# Patient Record
Sex: MALE | Race: WHITE | Employment: FULL TIME | URBAN - METROPOLITAN AREA
[De-identification: names, ages, dates, MRNs, and addresses within clinical notes are randomized per-mention and may not be internally consistent; named-entity substitution may affect disease eponyms.]

---

## 2017-01-13 ENCOUNTER — OFFICE VISIT (OUTPATIENT)
Dept: FAMILY MEDICINE | Facility: CLINIC | Age: 22
End: 2017-01-13
Payer: COMMERCIAL

## 2017-01-13 VITALS
OXYGEN SATURATION: 97 % | HEIGHT: 68 IN | TEMPERATURE: 98.4 F | BODY MASS INDEX: 34.54 KG/M2 | HEART RATE: 97 BPM | SYSTOLIC BLOOD PRESSURE: 138 MMHG | DIASTOLIC BLOOD PRESSURE: 82 MMHG | WEIGHT: 227.9 LBS

## 2017-01-13 DIAGNOSIS — A08.4 VIRAL GASTROENTERITIS: Primary | ICD-10-CM

## 2017-01-13 PROCEDURE — 99213 OFFICE O/P EST LOW 20 MIN: CPT | Performed by: FAMILY MEDICINE

## 2017-01-13 RX ORDER — ONDANSETRON 4 MG/1
4-8 TABLET, ORALLY DISINTEGRATING ORAL
Qty: 20 TABLET | Refills: 1 | Status: SHIPPED | OUTPATIENT
Start: 2017-01-13 | End: 2017-01-23

## 2017-01-13 NOTE — PROGRESS NOTES
"  SUBJECTIVE:                                                    Jesus Crow is a 21 year old male who presents to clinic today for the following health issues:    Acute Illness   Acute illness concerns: lower back pain  And stomach pain  Onset: yesterday jan 12     Fever: no    Chills/Sweats: YES    Headache (location?): YES    Sinus Pressure:no    Conjunctivitis:  no    Ear Pain: no    Rhinorrhea: no    Congestion: no    Sore Throat: no     Cough: no    Wheeze: no    Decreased Appetite: YES    Nausea: YES    Vomiting: YES    Diarrhea:  YES    Dysuria/Freq.: YES    Fatigue/Achiness: YES    Sick/Strep Exposure: no     Therapies Tried and outcome:       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     Reports vomiting overnight. Since then, feels nauseous but no vomiting.     Stool very loose, \"like water\", brown, no blood or mucous.     No new meds, supplements. No questionable foods.       Problem list and histories reviewed & adjusted, as indicated.  Additional history: none    Problem list, Medication list, Allergies, and Medical/Social/Surgical histories reviewed in [facility] and updated as appropriate.    ROS:  Constitutional, HEENT, cardiovascular, pulmonary, gi and gu systems are negative, except as otherwise noted.    OBJECTIVE:                                                    /82 mmHg  Pulse 97  Temp(Src) 98.4  F (36.9  C) (Oral)  Ht 5' 8\" (1.727 m)  Wt 227 lb 14.4 oz (103.375 kg)  BMI 34.66 kg/m2  SpO2 97%  Body mass index is 34.66 kg/(m^2).  GENERAL: healthy, alert and no distress  ABDOMEN: soft, nontender, no hepatosplenomegaly, no masses and overactive BS    Diagnostic Test Results:  none      ASSESSMENT/PLAN:                                                      1. Viral gastroenteritis - most likely diagnosis based on exposure and symptoms. Suggested anti-emetic, push fluids, bland foods as tolerated. Letter for work.   - ondansetron (ZOFRAN ODT) 4 MG ODT tab; Take 1-2 tablets (4-8 mg) by mouth 3 " times daily (before meals)  Dispense: 20 tablet; Refill: 1    Advised return should symptoms worsen.    Altagracia Younger MD  [facility]

## 2017-01-13 NOTE — PATIENT INSTRUCTIONS
Viral Gastroenteritis (Adult)    Gastroenteritis is commonly called the stomach flu. It is most often caused by a virus that affects the stomach and intestinal tract and usually lasts from 2 to 7 days. Common viruses causing gastroenteritis include norovirus, rotavirus, and hepatitis A. Non-viral causes of gastroenteritis include bacteria, parasites, and toxins.  The danger from repeated vomiting or diarrhea is dehydration. This is the loss of too much fluid from the body. When this occurs, body fluids must be replaced. Antibiotics do not help with this illness because it is usually viral.Simple home treatment will be helpful.  Symptoms of viral gastroenteritis may include:    Watery, loose stools    Stomach pain or abdominal cramps    Fever and chills    Nausea and vomiting    Loss of bowel control    Headache  Home care  Gastroenteritis is transmitted by contact with the stool or vomit of an infected person. This can occur from person to person or from contact with a contaminated surface.  Follow these guidelines when caring for yourself at home:    If symptoms are severe, rest at home for the next 24 hours or until you are feeling better.    Wash your hands with soap and water or use alcohol-based  to prevent the spread of infection. Wash your hands after touching anyone who is sick.    Wash your hands or use alcohol-based  after using the toilet and before meals. Clean the toilet after each use.  Remember these tips when preparing food:    People with diarrhea should not prepare or serve food to others. When preparing foods, wash your hands before and after.    Wash your hands after using cutting boards, countertops, knives, or utensils that have been in contact with raw food.    Keep uncooked meats away from cooked and ready-to-eat foods.  Medicine  You may use acetaminophen or NSAID medicines like ibuprofen or naproxen to control fever unless another medicine was given. If you have chronic  liver or kidney disease, talk with your healthcare provider before using these medicines. Also talk with your provider if you've had a stomach ulcer or gastrointestinal bleeding. Don't give aspirin to anyone under 18 years of age who is ill with a fever. It may cause severe liver damage. Don't use NSAIDS is you are already taking one for another condition (like arthritis) or are on aspirin (such as for heart disease or after a stroke).  If medicine for vomiting or diarrhea are prescribed, take these only as directed. Do not take over-the-counter medicines for vomiting or diarrhea unless instructed by your healthcare provider.  Diet  Follow these guidelines for food:    Water and liquids are important so you don't get dehydrated. Drink a small amount at a time or suck on ice chips if you are vomiting.    If you eat, avoid fatty, greasy, spicy, or fried foods.    Don't eat dairy if you have diarrhea. This can make diarrhea worse.    Avoid tobacco, alcohol, and caffeine which may worsen symptoms.  During the first 24 hours (the first full day), follow the diet below:    Beverages. Sports drinks, soft drinks without caffeine, ginger ale, mineral water (plain or flavored), decaffeinated tea and coffee. If you are very dehydrated, sports drinks aren't a good choice. They have too much sugar and not enough electrolytes. In this case, commercially available products called oral rehydration solutions, are best.    Soups. Eat clear broth, consommé, and bouillon.    Desserts. Eat gelatin, popsicles, and fruit juice bars.  During the next 24 hours (the second day), you may add the following to the above:    Hot cereal, plain toast, bread, rolls, and crackers    Plain noodles, rice, mashed potatoes, chicken noodle or rice soup    Unsweetened canned fruit (avoid pineapple), bananas    Limit fat intake to less than 15 grams per day. Do this by avoiding margarine, butter, oils, mayonnaise, sauces, gravies, fried foods, peanut  butter, meat, poultry, and fish.    Limit fiber and avoid raw or cooked vegetables, fresh fruits (except bananas), and bran cereals.    Limit caffeine and chocolate. Don't use spices or seasonings other than salt.    Limit dairy products.    Avoid alcohol.  During the next 24 hours:    Gradually resume a normal diet as you feel better and your symptoms improve.    If at any time it starts getting worse again, go back to clear liquids until you feel better.  Follow-up care  Follow up with your healthcare provider, or as advised. Call your provider if you don't get better within 24 hours or if diarrhea lasts more than a week. Also follow up if you are unable to keep down liquids and get dehydrated. If a stool (diarrhea) sample was taken, call as directed for the results.  Call 911  Call 911 if any of these occur:    Trouble breathing    Chest pain    Confused    Severe drowsiness or trouble awakening    Fainting or loss of consciousness    Rapid heart rate    Seizure    Stiff neck  When to seek medical advice  Call your healthcare provider right away if any of these occur:    Abdominal pain that gets worse    Continued vomiting (unable to keep liquids down)    Frequent diarrhea (more than 5 times a day)    Blood in vomit or stool (black or red color)    Dark urine, reduced urine output, or extreme thirst    Weakness or dizziness    Drowsiness    Fever of 100.4 F (38 C) oral or higher that does not get better with fever medicine    New rash    4481-9202 [employer]. [address] [place] [address], [place] [address]. All rights reserved. This information is not intended as a substitute for professional medical care. Always follow your healthcare professional's instructions.

## 2017-01-13 NOTE — Clinical Note
[facility]  [address] [address]  Phone: [phone]    01/13/2017    Jesus Crow  [address] [address]      To whom it may concern:     Jesus Crow was in clinic today. He has an illness and should not work until Tuesday, January 17th.       Sincerely,        Altagracia Younger MD

## 2017-01-13 NOTE — PROGRESS NOTES
"  SUBJECTIVE:                                                    Jesus Crow is a 21 year old male who presents to clinic today for the following health issues:  {Provider please address medication reconciliation discrepancies--rooming staff please delete if no med/rec issues}    ABDOMINAL PAIN     Onset: yesterday jan 12     Description:   Character: nausea  Location: {.:269377}  Radiation: {.:854303::\"None\"}    Intensity: {.:947882}    Progression of Symptoms:  {.:199263}    Accompanying Signs & Symptoms:  Fever/Chills?: { :992388}  Gas/Bloating: { :130650}  Nausea: { :526137}  Vomitting: { :594334}  Diarrhea?: { :153062}  Constipation:{ :925819}  Dysuria or Hematuria: { :632496}   History:   Trauma: { :117076}  Previous similar pain: { :240732}   Previous tests done: { .:495534}    Precipitating factors:   Does the pain change with:     Food: { :294630}     BM: { :275583}    Urination: { :064135}    Alleviating factors:  ***    Therapies Tried and outcome: ***    LMP:  {NOT applicable:411015::\"not applicable\"}       {additional problems for provider to add:336452}    Problem list and histories reviewed & adjusted, as indicated.  Additional history: {NONE - AS DOCUMENTED:102865::\"as documented\"}    {HIST REVIEW/ LINKS 2:717979}    {PROVIDER CHARTING PREFERENCE:682766}    "

## 2017-01-13 NOTE — MR AVS SNAPSHOT
After Visit Summary   1/13/2017    Jesus Crow    MRN: [id number]           Patient Information     Date Of Birth          1995        Visit Information        Provider Department      1/13/2017 3:15 PM Altagracia Younger MD [facility]        Today's Diagnoses     Viral gastroenteritis    -  1       Care Instructions      Viral Gastroenteritis (Adult)    Gastroenteritis is commonly called the stomach flu. It is most often caused by a virus that affects the stomach and intestinal tract and usually lasts from 2 to 7 days. Common viruses causing gastroenteritis include norovirus, rotavirus, and hepatitis A. Non-viral causes of gastroenteritis include bacteria, parasites, and toxins.  The danger from repeated vomiting or diarrhea is dehydration. This is the loss of too much fluid from the body. When this occurs, body fluids must be replaced. Antibiotics do not help with this illness because it is usually viral.Simple home treatment will be helpful.  Symptoms of viral gastroenteritis may include:    Watery, loose stools    Stomach pain or abdominal cramps    Fever and chills    Nausea and vomiting    Loss of bowel control    Headache  Home care  Gastroenteritis is transmitted by contact with the stool or vomit of an infected person. This can occur from person to person or from contact with a contaminated surface.  Follow these guidelines when caring for yourself at home:    If symptoms are severe, rest at home for the next 24 hours or until you are feeling better.    Wash your hands with soap and water or use alcohol-based  to prevent the spread of infection. Wash your hands after touching anyone who is sick.    Wash your hands or use alcohol-based  after using the toilet and before meals. Clean the toilet after each use.  Remember these tips when preparing food:    People with diarrhea should not prepare or serve food to others. When preparing foods, wash  your hands before and after.    Wash your hands after using cutting boards, countertops, knives, or utensils that have been in contact with raw food.    Keep uncooked meats away from cooked and ready-to-eat foods.  Medicine  You may use acetaminophen or NSAID medicines like ibuprofen or naproxen to control fever unless another medicine was given. If you have chronic liver or kidney disease, talk with your healthcare provider before using these medicines. Also talk with your provider if you've had a stomach ulcer or gastrointestinal bleeding. Don't give aspirin to anyone under 18 years of age who is ill with a fever. It may cause severe liver damage. Don't use NSAIDS is you are already taking one for another condition (like arthritis) or are on aspirin (such as for heart disease or after a stroke).  If medicine for vomiting or diarrhea are prescribed, take these only as directed. Do not take over-the-counter medicines for vomiting or diarrhea unless instructed by your healthcare provider.  Diet  Follow these guidelines for food:    Water and liquids are important so you don't get dehydrated. Drink a small amount at a time or suck on ice chips if you are vomiting.    If you eat, avoid fatty, greasy, spicy, or fried foods.    Don't eat dairy if you have diarrhea. This can make diarrhea worse.    Avoid tobacco, alcohol, and caffeine which may worsen symptoms.  During the first 24 hours (the first full day), follow the diet below:    Beverages. Sports drinks, soft drinks without caffeine, ginger ale, mineral water (plain or flavored), decaffeinated tea and coffee. If you are very dehydrated, sports drinks aren't a good choice. They have too much sugar and not enough electrolytes. In this case, commercially available products called oral rehydration solutions, are best.    Soups. Eat clear broth, consommé, and bouillon.    Desserts. Eat gelatin, popsicles, and fruit juice bars.  During the next 24 hours (the second day),  you may add the following to the above:    Hot cereal, plain toast, bread, rolls, and crackers    Plain noodles, rice, mashed potatoes, chicken noodle or rice soup    Unsweetened canned fruit (avoid pineapple), bananas    Limit fat intake to less than 15 grams per day. Do this by avoiding margarine, butter, oils, mayonnaise, sauces, gravies, fried foods, peanut butter, meat, poultry, and fish.    Limit fiber and avoid raw or cooked vegetables, fresh fruits (except bananas), and bran cereals.    Limit caffeine and chocolate. Don't use spices or seasonings other than salt.    Limit dairy products.    Avoid alcohol.  During the next 24 hours:    Gradually resume a normal diet as you feel better and your symptoms improve.    If at any time it starts getting worse again, go back to clear liquids until you feel better.  Follow-up care  Follow up with your healthcare provider, or as advised. Call your provider if you don't get better within 24 hours or if diarrhea lasts more than a week. Also follow up if you are unable to keep down liquids and get dehydrated. If a stool (diarrhea) sample was taken, call as directed for the results.  Call 911  Call 911 if any of these occur:    Trouble breathing    Chest pain    Confused    Severe drowsiness or trouble awakening    Fainting or loss of consciousness    Rapid heart rate    Seizure    Stiff neck  When to seek medical advice  Call your healthcare provider right away if any of these occur:    Abdominal pain that gets worse    Continued vomiting (unable to keep liquids down)    Frequent diarrhea (more than 5 times a day)    Blood in vomit or stool (black or red color)    Dark urine, reduced urine output, or extreme thirst    Weakness or dizziness    Drowsiness    Fever of 100.4 F (38 C) oral or higher that does not get better with fever medicine    New rash    5356-9977 [employer]. [address] [place] [address], [place] [address]. All rights reserved. This information is  "not intended as a substitute for professional medical care. Always follow your healthcare professional's instructions.              Follow-ups after your visit        Who to contact     If you have questions or need follow up information about today's clinic visit or your schedule please contact [facility] directly at [phone].  Normal or non-critical lab and imaging results will be communicated to you by MyChart, letter or phone within 4 business days after the clinic has received the results. If you do not hear from us within 7 days, please contact the clinic through [employer] or phone. If you have a critical or abnormal lab result, we will notify you by phone as soon as possible.  Submit refill requests through [employer] or call your pharmacy and they will forward the refill request to us. Please allow 3 business days for your refill to be completed.          Additional Information About Your Visit        MyChart Information     [employer] gives you secure access to your electronic health record. If you see a primary care provider, you can also send messages to your care team and make appointments. If you have questions, please call your primary care clinic.  If you do not have a primary care provider, please call [phone] and they will assist you.        Care EveryWhere ID     This is your Care EveryWhere ID. This could be used by other organizations to access your [place] medical records  [id number]        Your Vitals Were     Pulse Temperature Height BMI (Body Mass Index) Pulse Oximetry       97 98.4  F (36.9  C) (Oral) 5' 8\" (1.727 m) 34.66 kg/m2 97%        Blood Pressure from Last 3 Encounters:   01/13/17 138/82   09/27/16 139/82   09/10/16 122/82    Weight from Last 3 Encounters:   01/13/17 227 lb 14.4 oz (103.375 kg)   09/27/16 219 lb 9.6 oz (99.61 kg)   09/10/16 220 lb (99.791 kg)              Today, you had the following     No orders found for display         Today's Medication " Changes          These changes are accurate as of: 1/13/17  3:49 PM.  If you have any questions, ask your nurse or doctor.               Start taking these medicines.        Dose/Directions    ondansetron 4 MG ODT tab   Commonly known as:  ZOFRAN ODT   Used for:  Viral gastroenteritis   Started by:  Altagracia Younger MD        Dose:  4-8 mg   Take 1-2 tablets (4-8 mg) by mouth 3 times daily (before meals)   Quantity:  20 tablet   Refills:  1         Stop taking these medicines if you haven't already. Please contact your care team if you have questions.     ammonium lactate 12 % lotion   Commonly known as:  LAC-HYDRIN   Stopped by:  Altagracia Younger MD           amoxicillin-clavulanate 875-125 MG per tablet   Commonly known as:  AUGMENTIN   Stopped by:  Altagracia Younger MD           NO ACTIVE MEDICATIONS   Stopped by:  Altagracia Younger MD                Where to get your medicines      Information about where to get these medications is not yet available     ! Ask your nurse or doctor about these medications    - ondansetron 4 MG ODT tab             Primary Care Provider Office Phone # Fax #    Brenden Piña [phone]       [facility] [id number] [place] [id number]        Thank you!     Thank you for choosing [facility]  for your care. Our goal is always to provide you with excellent care. Hearing back from our patients is one way we can continue to improve our services. Please take a few minutes to complete the written survey that you may receive in the mail after your visit with us. Thank you!             Your Updated Medication List - Protect others around you: Learn how to safely use, store and throw away your medicines at www.disposemymeds.org.          This list is accurate as of: 1/13/17  3:49 PM.  Always use your most recent med list.                   Brand Name Dispense Instructions for use    ondansetron 4 MG ODT tab    ZOFRAN ODT    20  tablet    Take 1-2 tablets (4-8 mg) by mouth 3 times daily (before meals)

## 2017-01-13 NOTE — NURSING NOTE
"Chief Complaint   Patient presents with     Abdominal Pain       Initial /82 mmHg  Pulse 97  Temp(Src) 98.4  F (36.9  C) (Oral)  Ht 5' 8\" (1.727 m)  Wt 227 lb 14.4 oz (103.375 kg)  BMI 34.66 kg/m2  SpO2 97% Estimated body mass index is 34.66 kg/(m^2) as calculated from the following:    Height as of this encounter: 5' 8\" (1.727 m).    Weight as of this encounter: 227 lb 14.4 oz (103.375 kg).  BP completed using cuff size: large Erlinda Yeh CMA      "

## 2017-01-23 ENCOUNTER — OFFICE VISIT (OUTPATIENT)
Dept: FAMILY MEDICINE | Facility: CLINIC | Age: 22
End: 2017-01-23
Payer: COMMERCIAL

## 2017-01-23 VITALS
DIASTOLIC BLOOD PRESSURE: 70 MMHG | WEIGHT: 227.7 LBS | TEMPERATURE: 98.3 F | SYSTOLIC BLOOD PRESSURE: 122 MMHG | OXYGEN SATURATION: 98 % | BODY MASS INDEX: 33.72 KG/M2 | HEIGHT: 69 IN | HEART RATE: 73 BPM

## 2017-01-23 DIAGNOSIS — R41.840 ATTENTION OR CONCENTRATION DEFICIT: Primary | ICD-10-CM

## 2017-01-23 PROCEDURE — 99213 OFFICE O/P EST LOW 20 MIN: CPT | Performed by: FAMILY MEDICINE

## 2017-01-23 NOTE — PROGRESS NOTES
"  SUBJECTIVE:                                                    Jesus Crow is a 21 year old male who presents to clinic today for the following health issues:    Patient presents with:  KRISTEN    Patient here with concerns of attention difficulties. Patient had significant difficulty with attention, organization, impulsivity in the past without hyperactivity. Had difficulty in school and dropped out of school in 11th grade. Currently working as a .  Has family history of ADHD in a sibling with other bipolar and mental health issues and siblings and family. Denies drug use.     Patient Active Problem List   Diagnosis     Allergic rhinitis     Chronic daily headache     Keratosis pilaris     Insomnia     Past Surgical History   Procedure Laterality Date     No history of surgery         Social History   Substance Use Topics     Smoking status: Passive Smoke Exposure - Never Smoker     Smokeless tobacco: Never Used      Comment: brother smokes outside     Alcohol Use: No     Family History   Problem Relation Age of Onset     Hypertension Mother      Hypertension Father      CANCER Maternal Grandmother      breast cancer     C.A.D. Maternal Grandmother      CANCER Paternal Grandmother      thyroid cancer         ROS:  PSYCHIATRIC: NEGATIVE for changes in mood or affect    OBJECTIVE:                                                    /70 mmHg  Pulse 73  Temp(Src) 98.3  F (36.8  C) (Oral)  Ht 5' 9\" (1.753 m)  Wt 227 lb 11.2 oz (103.284 kg)  BMI 33.61 kg/m2  SpO2 98%Body mass index is 33.61 kg/(m^2).  GENERAL APPEARANCE: healthy, alert and no distress  PSYCH: mentation appears normal and affect normal/bright     ASSESSMENT/PLAN:                                                    1. Attention or concentration deficit  Recommend further evaluation with psychology for assessment of ADHD. Based on his previous and current difficulties with attention, impulsivity, organization this appears to be " likely. Will rule out other confounding factors with full evaluation with adult diagnosis but certainly has aspects of attention deficit as child and family history. Follow up in clinic for trial of stimulant medication if evaluation is as expected.  - MENTAL HEALTH REFERRAL    Xavier Yeung MD  [facility]

## 2017-01-23 NOTE — MR AVS SNAPSHOT
After Visit Summary   1/23/2017    Jesus Crow    MRN: [id number]           Patient Information     Date Of Birth          1995        Visit Information        Provider Department      1/23/2017 2:30 PM Xavier Yeung MD [facility]        Today's Diagnoses     Attention or concentration deficit    -  1        Follow-ups after your visit        Additional Services     MENTAL HEALTH REFERRAL       Your provider has referred you to: [facility]: [facility] - ADHD & Bariatric Assessments - Adult ADHD Evaluations - [facility] [place] [phone]   [email or web address]   *Please call to schedule an appointment.    All scheduling is subject to the client's specific insurance plan & benefits, provider/location availability, and provider clinical specialities.  Please arrive 15 minutes early for your first appointment and bring your completed paperwork.    Please be aware that coverage of these services is subject to the terms and limitations of your health insurance plan.  Call member services at your health plan with any benefit or coverage questions.                  Who to contact     If you have questions or need follow up information about today's clinic visit or your schedule please contact [facility] directly at [phone].  Normal or non-critical lab and imaging results will be communicated to you by MyChart, letter or phone within 4 business days after the clinic has received the results. If you do not hear from us within 7 days, please contact the clinic through MyChart or phone. If you have a critical or abnormal lab result, we will notify you by phone as soon as possible.  Submit refill requests through [employer] or call your pharmacy and they will forward the refill request to us. Please allow 3 business days for your refill to be completed.          Additional Information  "About Your Visit        MyChart Information     The [employer] gives you secure access to your electronic health record. If you see a primary care provider, you can also send messages to your care team and make appointments. If you have questions, please call your primary care clinic.  If you do not have a primary care provider, please call [phone] and they will assist you.        Care EveryWhere ID     This is your Care EveryWhere ID. This could be used by other organizations to access your [place] medical records  [id number]        Your Vitals Were     Pulse Temperature Height BMI (Body Mass Index) Pulse Oximetry       73 98.3  F (36.8  C) (Oral) 5' 9\" (1.753 m) 33.61 kg/m2 98%        Blood Pressure from Last 3 Encounters:   01/23/17 122/70   01/13/17 138/82   09/27/16 139/82    Weight from Last 3 Encounters:   01/23/17 227 lb 11.2 oz (103.284 kg)   01/13/17 227 lb 14.4 oz (103.375 kg)   09/27/16 219 lb 9.6 oz (99.61 kg)              We Performed the Following     MENTAL HEALTH REFERRAL          Today's Medication Changes          These changes are accurate as of: 1/23/17  2:46 PM.  If you have any questions, ask your nurse or doctor.               Stop taking these medicines if you haven't already. Please contact your care team if you have questions.     ondansetron 4 MG ODT tab   Commonly known as:  ZOFRAN ODT   Stopped by:  Xavier Yeung MD                    Primary Care Provider Office Phone # Fax #    Brenden Piña [phone]       [facility] [address] [place] [address]        Thank you!     Thank you for choosing [facility]  for your care. Our goal is always to provide you with excellent care. Hearing back from our patients is one way we can continue to improve our services. Please take a few minutes to complete the written survey that you may receive in the mail after your visit with us. Thank you!             Your Updated Medication List - " Protect others around you: Learn how to safely use, store and throw away your medicines at www.disposemymeds.org.      Notice  As of 1/23/2017  2:46 PM    You have not been prescribed any medications.

## 2017-01-23 NOTE — NURSING NOTE
"Chief Complaint   Patient presents with     A.D.H.D       Initial /70 mmHg  Pulse 73  Temp(Src) 98.3  F (36.8  C) (Oral)  Ht 5' 9\" (1.753 m)  Wt 227 lb 11.2 oz (103.284 kg)  BMI 33.61 kg/m2  SpO2 98% Estimated body mass index is 33.61 kg/(m^2) as calculated from the following:    Height as of this encounter: 5' 9\" (1.753 m).    Weight as of this encounter: 227 lb 11.2 oz (103.284 kg).      Concha Gregg, [facility]    Health Maintenance- Reviewed.                "

## 2017-02-22 ENCOUNTER — OFFICE VISIT (OUTPATIENT)
Dept: PSYCHOLOGY | Facility: CLINIC | Age: 22
End: 2017-02-22
Payer: COMMERCIAL

## 2017-02-22 DIAGNOSIS — Z53.9 DIAGNOSIS NOT YET DEFINED: Primary | ICD-10-CM

## 2017-02-22 PROCEDURE — 90834 PSYTX W PT 45 MINUTES: CPT | Performed by: PSYCHOLOGIST

## 2017-02-22 ASSESSMENT — ANXIETY QUESTIONNAIRES
2. NOT BEING ABLE TO STOP OR CONTROL WORRYING: NOT AT ALL
5. BEING SO RESTLESS THAT IT IS HARD TO SIT STILL: MORE THAN HALF THE DAYS
6. BECOMING EASILY ANNOYED OR IRRITABLE: NOT AT ALL
7. FEELING AFRAID AS IF SOMETHING AWFUL MIGHT HAPPEN: NOT AT ALL
3. WORRYING TOO MUCH ABOUT DIFFERENT THINGS: NOT AT ALL
1. FEELING NERVOUS, ANXIOUS, OR ON EDGE: NOT AT ALL
IF YOU CHECKED OFF ANY PROBLEMS ON THIS QUESTIONNAIRE, HOW DIFFICULT HAVE THESE PROBLEMS MADE IT FOR YOU TO DO YOUR WORK, TAKE CARE OF THINGS AT HOME, OR GET ALONG WITH OTHER PEOPLE: NOT DIFFICULT AT ALL
GAD7 TOTAL SCORE: 2

## 2017-02-22 ASSESSMENT — PATIENT HEALTH QUESTIONNAIRE - PHQ9: 5. POOR APPETITE OR OVEREATING: NOT AT ALL

## 2017-02-22 NOTE — MR AVS SNAPSHOT
MRN:[id number]                      After Visit Summary   2/22/2017    Jesus Crow    MRN: [id number]           Visit Information        Provider Department      2/22/2017 8:00 AM Jane Tompkins PsyD [facility]      Your next 10 appointments already scheduled     Mar 01, 2017  2:00 PM CST   Return Visit with Jane Tompkins PsyD   [facility] [place] [facility]    [address] [address] [phone]              MyChart Information     [employer] gives you secure access to your electronic health record. If you see a primary care provider, you can also send messages to your care team and make appointments. If you have questions, please call your primary care clinic.  If you do not have a primary care provider, please call [phone] and they will assist you.        Care EveryWhere ID     This is your Care EveryWhere ID. This could be used by other organizations to access your [place] medical records  [id number]

## 2017-02-28 ASSESSMENT — ANXIETY QUESTIONNAIRES: GAD7 TOTAL SCORE: 2

## 2017-02-28 ASSESSMENT — PATIENT HEALTH QUESTIONNAIRE - PHQ9: SUM OF ALL RESPONSES TO PHQ QUESTIONS 1-9: 7

## 2017-02-28 NOTE — PROGRESS NOTES
Progress Note - Initial Session    Client Name:  Jesus Crow Date: 2/22/2017         Service Type: psychological evaluation      Session Start Time: 8:00  Session End Time: 8:45      Session Length: 45     Session #: 1     Attendees: Client attended alone         Diagnostic Assessment in progress.  Unable to complete documentation at the conclusion of the first session due to more time needed to complete diagnostic interview.        Mental Status Assessment:  Appearance:   Appropriate   Eye Contact:   Good   Psychomotor Behavior: Normal   Attitude:   Cooperative   Orientation:   All  Speech   Rate / Production: Normal    Volume:  Normal   Mood:    Normal  Affect:    Appropriate   Thought Content:  Clear   Thought Form:  Coherent  Logical   Insight:    Good       Safety Issues and Plan for Safety and Risk Management:  Client denies current fears or concerns for personal safety.  Client denies current or recent suicidal ideation or behaviors.  Client denies current or recent homicidal ideation or behaviors.  Client denies current or recent self injurious behavior or ideation.  Client denies other safety concerns.  A safety and risk management plan has not been developed at this time, however client was given the after-hours number / 911 should there be a change in any of these risk factors.  Client reports there are no firearms in the house.      Diagnostic Criteria:  Diagnosis not yet defined        DSM5 Diagnoses: (Sustained by DSM5 Criteria Listed Above)  Diagnoses: Diagnosis not yet defined  Psychosocial & Contextual Factors:   WHODAS 2.0 (12 item)            This questionnaire asks about difficulties due to health conditions. Health conditions  include  disease or illnesses, other health problems that may be short or long lasting,  injuries, mental health or emotional problems, and problems with alcohol or drugs.                     Think back over the past 30 days and answer these  questions, thinking about how much  difficulty you had doing the following activities. For each question, please [place] only  one response.    S1 Standing for long periods such as 30 minutes? None =         1   S2 Taking care of household responsibilities? Extreme / or cannot do = 5   S3 Learning a new task, for example, learning how to get to a new place? Moderate =   3   S4 How much of a problem do you have joining community activities (for example, festivals, Zoroastrian or other activities) in the same way as anyone else can? Severe =       4   S5 How much have you been emotionally affected by your health problems? Moderate =   3     In the past 30 days, how much difficulty did you have in:   S6 Concentrating on doing something for ten minutes? Severe =       4   S7 Walking a long distance such as a kilometer (or equivalent)? None =         1   S8 Washing your whole body? None =         1   S9 Getting dressed? None =         1   S10 Dealing with people you do not know? Mild =           2   S11 Maintaining a friendship? Mild =           2   S12 Your day to day work? None =         1     H1 Overall, in the past 30 days, how many days were these difficulties present? Record number of days 30   H2 In the past 30 days, for how many days were you totally unable to carry out your usual activities or work because of any health condition? Record number of days  10   H3 In the past 30 days, not counting the days that you were totally unable, for how many days did you cut back or reduce your usual activities or work because of any health condition? Record number of days 20       Collateral Reports Completed:  client completed Bonnie self-report scales      PLAN: (Homework, other):  Client stated that he may follow up for ongoing services with [facility].  Complete diagnostic interview next visit. Client was given collateral report forms.       Jane Tompkins PsyD LP

## 2017-03-01 ENCOUNTER — OFFICE VISIT (OUTPATIENT)
Dept: PSYCHOLOGY | Facility: CLINIC | Age: 22
End: 2017-03-01
Payer: COMMERCIAL

## 2017-03-01 DIAGNOSIS — F90.2 ATTENTION DEFICIT HYPERACTIVITY DISORDER, COMBINED TYPE: Primary | ICD-10-CM

## 2017-03-01 PROCEDURE — 90791 PSYCH DIAGNOSTIC EVALUATION: CPT | Performed by: PSYCHOLOGIST

## 2017-03-01 NOTE — MR AVS SNAPSHOT
MRN:[id number]                      After Visit Summary   3/1/2017    Jesus Crow    MRN: [id number]           Visit Information        Provider Department      3/1/2017 2:00 PM Jane Tompkins PsyD [facility]      MyChart Information     MyChart gives you secure access to your electronic health record. If you see a primary care provider, you can also send messages to your care team and make appointments. If you have questions, please call your primary care clinic.  If you do not have a primary care provider, please call [phone] and they will assist you.        Care EveryWhere ID     This is your Care EveryWhere ID. This could be used by other organizations to access your [place] medical records  [id number]
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
## 2017-04-24 ENCOUNTER — OFFICE VISIT (OUTPATIENT)
Dept: PSYCHOLOGY | Facility: CLINIC | Age: 22
End: 2017-04-24
Payer: COMMERCIAL

## 2017-04-24 DIAGNOSIS — F90.2 ATTENTION DEFICIT HYPERACTIVITY DISORDER, COMBINED TYPE: Primary | ICD-10-CM

## 2017-04-24 PROCEDURE — 96101 HC PSYCHOLOGICAL TEST BY PSYCHOLOGIST/MD, PER HR: CPT | Performed by: PSYCHOLOGIST

## 2017-04-24 PROCEDURE — 90834 PSYTX W PT 45 MINUTES: CPT | Mod: 59 | Performed by: PSYCHOLOGIST

## 2017-04-24 NOTE — MR AVS SNAPSHOT
MRN:[id number]                      After Visit Summary   4/24/2017    Jesus Crow    MRN: [id number]           Visit Information        Provider Department      4/24/2017 8:00 AM Jane Tompkins PsyD [facility] ADHD      Your next 10 appointments already scheduled     Apr 28, 2017  9:45 AM CDT   SHORT with Xavier Yeung MD   [facility] ([address] [address] [phone]              MyChart Information     [employer] gives you secure access to your electronic health record. If you see a primary care provider, you can also send messages to your care team and make appointments. If you have questions, please call your primary care clinic.  If you do not have a primary care provider, please call [phone] and they will assist you.        Care EveryWhere ID     This is your Care EveryWhere ID. This could be used by other organizations to access your [facility] medical records  [id number]

## 2017-04-28 ENCOUNTER — OFFICE VISIT (OUTPATIENT)
Dept: FAMILY MEDICINE | Facility: CLINIC | Age: 22
End: 2017-04-28
Payer: COMMERCIAL

## 2017-04-28 VITALS
TEMPERATURE: 98.6 F | SYSTOLIC BLOOD PRESSURE: 134 MMHG | BODY MASS INDEX: 33.33 KG/M2 | OXYGEN SATURATION: 96 % | HEART RATE: 76 BPM | WEIGHT: 225 LBS | DIASTOLIC BLOOD PRESSURE: 82 MMHG | RESPIRATION RATE: 12 BRPM | HEIGHT: 69 IN

## 2017-04-28 DIAGNOSIS — F90.2 ADHD (ATTENTION DEFICIT HYPERACTIVITY DISORDER), COMBINED TYPE: Primary | ICD-10-CM

## 2017-04-28 DIAGNOSIS — Z23 NEED FOR VACCINATION: ICD-10-CM

## 2017-04-28 DIAGNOSIS — Z23 NEED FOR PROPHYLACTIC VACCINATION WITH TETANUS-DIPHTHERIA (TD): ICD-10-CM

## 2017-04-28 PROCEDURE — 99213 OFFICE O/P EST LOW 20 MIN: CPT | Performed by: FAMILY MEDICINE

## 2017-04-28 RX ORDER — DEXTROAMPHETAMINE SACCHARATE, AMPHETAMINE ASPARTATE, DEXTROAMPHETAMINE SULFATE AND AMPHETAMINE SULFATE 5; 5; 5; 5 MG/1; MG/1; MG/1; MG/1
20 TABLET ORAL DAILY
Qty: 30 TABLET | Refills: 0 | Status: SHIPPED | OUTPATIENT
Start: 2017-04-28 | End: 2017-05-24

## 2017-04-28 NOTE — PROGRESS NOTES
"  SUBJECTIVE:                                                    Jesus Crow is a 21 year old male who presents to clinic today for the following health issues:    Patient presents with:  Consult: Here to discuss results of ADHD/ADD testing    Patient with recent diagnosis of ADHD in conjunction with psychiatry, see notes for detail. Patient with family history of ADHD and onset of symptoms at a young age with persistence into adulthood.  Patient dropped out of school in 11th grade. Currently working as a . Hoping to go back to school eventually.  No substance abuse history.    ROS:  PSYCHIATRIC: NEGATIVE for changes in mood or affect    OBJECTIVE:                                                    /82 (BP Location: Right arm, Patient Position: Chair, Cuff Size: Adult Large)  Pulse 76  Temp 98.6  F (37  C) (Oral)  Resp 12  Ht 5' 9\" (1.753 m)  Wt 225 lb (102.1 kg)  SpO2 96%  BMI 33.23 kg/m2Body mass index is 33.23 kg/(m^2).  GENERAL APPEARANCE: healthy, alert and no distress  PSYCH: mentation appears normal and affect normal/bright     ASSESSMENT/PLAN:                                                    1. ADHD (attention deficit hyperactivity disorder), combined type  Discussed trial of medication below. He will call in 1 month to let us know how medication is doing. If working we will refill for 2 months and see him in 3 months.  - amphetamine-dextroamphetamine (ADDERALL) 20 MG per tablet; Take 1 tablet (20 mg) by mouth daily  Dispense: 30 tablet; Refill: 0    2. Need for prophylactic vaccination with tetanus-diphtheria (TD)  He will return for vaccines.    3. Need for vaccination    Xavier Yeung MD  [facility]    "

## 2017-04-28 NOTE — MR AVS SNAPSHOT
"              After Visit Summary   4/28/2017    Jesus Crow    MRN: [id number]           Patient Information     Date Of Birth          1995        Visit Information        Provider Department      4/28/2017 9:45 AM Xavier Yeung MD [facility]        Today's Diagnoses     ADHD (attention deficit hyperactivity disorder), combined type    -  1    Need for prophylactic vaccination with tetanus-diphtheria (TD)        Need for vaccination           Follow-ups after your visit        Who to contact     If you have questions or need follow up information about today's clinic visit or your schedule please contact [facility] directly at [phone].  Normal or non-critical lab and imaging results will be communicated to you by MyChart, letter or phone within 4 business days after the clinic has received the results. If you do not hear from us within 7 days, please contact the clinic through Quibb[employer] or phone. If you have a critical or abnormal lab result, we will notify you by phone as soon as possible.  Submit refill requests through [employer] or call your pharmacy and they will forward the refill request to us. Please allow 3 business days for your refill to be completed.          Additional Information About Your Visit        MyChart Information     [employer] gives you secure access to your electronic health record. If you see a primary care provider, you can also send messages to your care team and make appointments. If you have questions, please call your primary care clinic.  If you do not have a primary care provider, please call [phone] and they will assist you.        Care EveryWhere ID     This is your Care EveryWhere ID. This could be used by other organizations to access your [facility] medical records  [id number]        Your Vitals Were     Pulse Temperature Respirations Height Pulse Oximetry BMI (Body Mass Index)    76 98.6  F (37  C) (Oral) 12 5' 9\" (1.753 m) " 96% 33.23 kg/m2       Blood Pressure from Last 3 Encounters:   04/28/17 134/82   01/23/17 122/70   01/13/17 138/82    Weight from Last 3 Encounters:   04/28/17 225 lb (102.1 kg)   01/23/17 227 lb 11.2 oz (103.3 kg)   01/13/17 227 lb 14.4 oz (103.4 kg)              Today, you had the following     No orders found for display         Today's Medication Changes          These changes are accurate as of: 4/28/17 12:55 PM.  If you have any questions, ask your nurse or doctor.               Start taking these medicines.        Dose/Directions    amphetamine-dextroamphetamine 20 MG per tablet   Commonly known as:  ADDERALL   Used for:  ADHD (attention deficit hyperactivity disorder), combined type   Started by:  Xavier Yeung MD        Dose:  20 mg   Take 1 tablet (20 mg) by mouth daily   Quantity:  30 tablet   Refills:  0            Where to get your medicines      Some of these will need a paper prescription and others can be bought over the counter.  Ask your nurse if you have questions.     Bring a paper prescription for each of these medications     amphetamine-dextroamphetamine 20 MG per tablet                Primary Care Provider Office Phone # Fax #    Brenden Piña [phone]       [address] [address]        Thank you!     Thank you for choosing [facility]  for your care. Our goal is always to provide you with excellent care. Hearing back from our patients is one way we can continue to improve our services. Please take a few minutes to complete the written survey that you may receive in the mail after your visit with us. Thank you!             Your Updated Medication List - Protect others around you: Learn how to safely use, store and throw away your medicines at www.disposemymeds.org.          This list is accurate as of: 4/28/17 12:55 PM.  Always use your most recent med list.                   Brand Name Dispense Instructions for use     amphetamine-dextroamphetamine 20 MG per tablet    ADDERALL    30 tablet    Take 1 tablet (20 mg) by mouth daily

## 2017-04-28 NOTE — NURSING NOTE
"Chief Complaint   Patient presents with     Consult     Here to discuss results of ADHD/ADD testing       Initial /82 (BP Location: Right arm, Patient Position: Chair, Cuff Size: Adult Large)  Pulse 76  Temp 98.6  F (37  C) (Oral)  Resp 12  Ht 5' 9\" (1.753 m)  Wt 225 lb (102.1 kg)  SpO2 96%  BMI 33.23 kg/m2 Estimated body mass index is 33.23 kg/(m^2) as calculated from the following:    Height as of this encounter: 5' 9\" (1.753 m).    Weight as of this encounter: 225 lb (102.1 kg).  Medication Reconciliation: complete   Pastora Rice,CHRISTO        "

## 2017-05-24 ENCOUNTER — OFFICE VISIT (OUTPATIENT)
Dept: FAMILY MEDICINE | Facility: CLINIC | Age: 22
End: 2017-05-24
Payer: COMMERCIAL

## 2017-05-24 VITALS
DIASTOLIC BLOOD PRESSURE: 80 MMHG | WEIGHT: 213.1 LBS | BODY MASS INDEX: 31.56 KG/M2 | TEMPERATURE: 98.4 F | OXYGEN SATURATION: 98 % | RESPIRATION RATE: 19 BRPM | HEIGHT: 69 IN | SYSTOLIC BLOOD PRESSURE: 114 MMHG | HEART RATE: 104 BPM

## 2017-05-24 DIAGNOSIS — F90.2 ADHD (ATTENTION DEFICIT HYPERACTIVITY DISORDER), COMBINED TYPE: ICD-10-CM

## 2017-05-24 PROCEDURE — 99213 OFFICE O/P EST LOW 20 MIN: CPT | Performed by: FAMILY MEDICINE

## 2017-05-24 RX ORDER — METHYLPHENIDATE HYDROCHLORIDE 27 MG/1
27 TABLET ORAL EVERY MORNING
Qty: 30 TABLET | Refills: 0 | Status: SHIPPED | OUTPATIENT
Start: 2017-05-24 | End: 2017-07-19 | Stop reason: ALTCHOICE

## 2017-05-24 NOTE — MR AVS SNAPSHOT
"              After Visit Summary   5/24/2017    Jesus Crow    MRN: [id number]           Patient Information     Date Of Birth          1995        Visit Information        Provider Department      5/24/2017 1:30 PM Xavier Yeung MD [facility]        Today's Diagnoses     ADHD (attention deficit hyperactivity disorder), combined type           Follow-ups after your visit        Who to contact     If you have questions or need follow up information about today's clinic visit or your schedule please contact [facility] directly at [phone].  Normal or non-critical lab and imaging results will be communicated to you by Bright Industry[employer], letter or phone within 4 business days after the clinic has received the results. If you do not hear from us within 7 days, please contact the clinic through [employer] or phone. If you have a critical or abnormal lab result, we will notify you by phone as soon as possible.  Submit refill requests through [employer] or call your pharmacy and they will forward the refill request to us. Please allow 3 business days for your refill to be completed.          Additional Information About Your Visit        MyChart Information     [employer] gives you secure access to your electronic health record. If you see a primary care provider, you can also send messages to your care team and make appointments. If you have questions, please call your primary care clinic.  If you do not have a primary care provider, please call [phone] and they will assist you.        Care EveryWhere ID     This is your Care EveryWhere ID. This could be used by other organizations to access your [facility] medical records  [id number]        Your Vitals Were     Pulse Temperature Respirations Height Pulse Oximetry BMI (Body Mass Index)    104 98.4  F (36.9  C) (Oral) 19 5' 9\" (1.753 m) 98% 31.47 kg/m2       Blood Pressure from Last 3 Encounters:   05/24/17 114/80   04/28/17 134/82 "   01/23/17 122/70    Weight from Last 3 Encounters:   05/24/17 213 lb 1.6 oz (96.7 kg)   04/28/17 225 lb (102.1 kg)   01/23/17 227 lb 11.2 oz (103.3 kg)              Today, you had the following     No orders found for display         Today's Medication Changes          These changes are accurate as of: 5/24/17  1:41 PM.  If you have any questions, ask your nurse or doctor.               Start taking these medicines.        Dose/Directions    methylphenidate ER 27 MG CR tablet   Commonly known as:  CONCERTA   Used for:  ADHD (attention deficit hyperactivity disorder), combined type   Started by:  Xavier Yeung MD        Dose:  27 mg   Take 1 tablet (27 mg) by mouth every morning   Quantity:  30 tablet   Refills:  0         Stop taking these medicines if you haven't already. Please contact your care team if you have questions.     amphetamine-dextroamphetamine 20 MG per tablet   Commonly known as:  ADDERALL   Stopped by:  Xavier Yeung MD                Where to get your medicines      Some of these will need a paper prescription and others can be bought over the counter.  Ask your nurse if you have questions.     Bring a paper prescription for each of these medications     methylphenidate ER 27 MG CR tablet                Primary Care Provider Office Phone # Fax #    Brenden Piña [phone]       [facility] [id number] [place] [address]        Thank you!     Thank you for choosing [facility]  for your care. Our goal is always to provide you with excellent care. Hearing back from our patients is one way we can continue to improve our services. Please take a few minutes to complete the written survey that you may receive in the mail after your visit with us. Thank you!             Your Updated Medication List - Protect others around you: Learn how to safely use, store and throw away your medicines at www.disposemymeds.org.          This list is accurate as  of: 5/24/17  1:41 PM.  Always use your most recent med list.                   Brand Name Dispense Instructions for use    methylphenidate ER 27 MG CR tablet    CONCERTA    30 tablet    Take 1 tablet (27 mg) by mouth every morning

## 2017-05-24 NOTE — NURSING NOTE
"Chief Complaint   Patient presents with     Recheck Medication       Initial /80 (BP Location: Right arm, Patient Position: Chair, Cuff Size: Adult Regular)  Pulse 104  Temp 98.4  F (36.9  C) (Oral)  Resp 19  Ht 5' 9\" (1.753 m)  Wt 213 lb 1.6 oz (96.7 kg)  SpO2 98%  BMI 31.47 kg/m2 Estimated body mass index is 31.47 kg/(m^2) as calculated from the following:    Height as of this encounter: 5' 9\" (1.753 m).    Weight as of this encounter: 213 lb 1.6 oz (96.7 kg).    Medication Reconciliation: complete    Concha Gregg [facility]      Health Maintenance- Has Been Reviewed.                "

## 2017-05-24 NOTE — PROGRESS NOTES
"  SUBJECTIVE:                                                    Jesus Crow is a 21 year old male who presents to clinic today for the following health issues:    Patient presents with:  Recheck Medication    Patient with recent diagnosis of ADHD in conjunction with psychiatry, see notes for detail. Patient with family history of ADHD and onset of symptoms at a young age with persistence into adulthood.  Patient dropped out of school in 11th grade. Currently working as a . Hoping to go back to school eventually.  No substance abuse history.  Started on adderall 20 mg daily.  He states this is helping somewhat but not lasting long enough through the day and would like to consider other medication.    ROS:  PSYCHIATRIC: As noted above    OBJECTIVE:                                                    /80 (BP Location: Right arm, Patient Position: Chair, Cuff Size: Adult Regular)  Pulse 104  Temp 98.4  F (36.9  C) (Oral)  Resp 19  Ht 5' 9\" (1.753 m)  Wt 213 lb 1.6 oz (96.7 kg)  SpO2 98%  BMI 31.47 kg/m2Body mass index is 31.47 kg/(m^2).  GENERAL APPEARANCE: healthy, alert and no distress  PSYCH: mentation appears normal and affect normal/bright     ASSESSMENT/PLAN:                                                    1. ADHD (attention deficit hyperactivity disorder), combined type  Trial of medication below.  Follow-up if not helping, if effective can call for refill in 1 month.  - methylphenidate ER (CONCERTA) 27 MG CR tablet; Take 1 tablet (27 mg) by mouth every morning  Dispense: 30 tablet; Refill: 0    Xavier Yeung MD  [facility]    "

## 2017-05-31 ENCOUNTER — OFFICE VISIT (OUTPATIENT)
Dept: FAMILY MEDICINE | Facility: CLINIC | Age: 22
End: 2017-05-31
Payer: COMMERCIAL

## 2017-05-31 ENCOUNTER — TELEPHONE (OUTPATIENT)
Dept: FAMILY MEDICINE | Facility: CLINIC | Age: 22
End: 2017-05-31

## 2017-05-31 VITALS
HEIGHT: 69 IN | TEMPERATURE: 98.2 F | RESPIRATION RATE: 20 BRPM | DIASTOLIC BLOOD PRESSURE: 82 MMHG | SYSTOLIC BLOOD PRESSURE: 138 MMHG | OXYGEN SATURATION: 97 % | BODY MASS INDEX: 31.7 KG/M2 | WEIGHT: 214 LBS | HEART RATE: 82 BPM

## 2017-05-31 DIAGNOSIS — F90.0 ATTENTION DEFICIT HYPERACTIVITY DISORDER (ADHD), PREDOMINANTLY INATTENTIVE TYPE: Primary | ICD-10-CM

## 2017-05-31 PROCEDURE — 99214 OFFICE O/P EST MOD 30 MIN: CPT | Performed by: FAMILY MEDICINE

## 2017-05-31 RX ORDER — DEXTROAMPHETAMINE SACCHARATE, AMPHETAMINE ASPARTATE MONOHYDRATE, DEXTROAMPHETAMINE SULFATE AND AMPHETAMINE SULFATE 2.5; 2.5; 2.5; 2.5 MG/1; MG/1; MG/1; MG/1
10 CAPSULE, EXTENDED RELEASE ORAL DAILY
Qty: 7 CAPSULE | Refills: 0 | Status: SHIPPED | OUTPATIENT
Start: 2017-05-31 | End: 2017-06-05

## 2017-05-31 NOTE — TELEPHONE ENCOUNTER
Jesus Bonner Dr. presented with #28 methylphenidate 27 mg pills remaining in Rx bottle.  He will dispose as discussed with you earlier.   Forrest Ingram RN

## 2017-05-31 NOTE — TELEPHONE ENCOUNTER
Per Dr. Piña, pt will be stopping by with methylphenidate ER (CONCERTA) 27 MG CR tablet.    Dr. Piña asks RN to count pills - Expects 25-27 in the bottle. After count we will return pills to patient for drop off in the community ([facility]  Please document count and forward # to Dr. Piña.  Forrest Ingram, RN

## 2017-05-31 NOTE — NURSING NOTE
"Chief Complaint   Patient presents with     Recheck Medication     Concerta - not working       Initial /82 (BP Location: Right arm, Patient Position: Chair, Cuff Size: Adult Regular)  Pulse 82  Temp 98.2  F (36.8  C) (Oral)  Resp 20  Ht 5' 9\" (1.753 m)  Wt 214 lb (97.1 kg)  SpO2 97%  BMI 31.6 kg/m2 Estimated body mass index is 31.6 kg/(m^2) as calculated from the following:    Height as of this encounter: 5' 9\" (1.753 m).    Weight as of this encounter: 214 lb (97.1 kg).  Medication Reconciliation: complete   Pastora Rust, CHRISTO      "

## 2017-05-31 NOTE — PROGRESS NOTES
SUBJECTIVE:                                                    Jesus Crow is a 21 year old male who presents to clinic today for the following health issues:      Medication Followup of Concerta    Taking Medication as prescribed: yes    Side Effects:  Multiple:    Pt noticed that his emotions were suppressed, felt that his taste was affected and not helping his symptoms.    Medication Helping Symptoms:  NO           Problem list and histories reviewed & adjusted, as indicated.  Additional history: as documented    Patient Active Problem List   Diagnosis     Allergic rhinitis     Chronic daily headache     Keratosis pilaris     Insomnia     Attention deficit hyperactivity disorder (ADHD), predominantly inattentive type     Past Surgical History:   Procedure Laterality Date     NO HISTORY OF SURGERY         Social History   Substance Use Topics     Smoking status: Passive Smoke Exposure - Never Smoker     Smokeless tobacco: Never Used      Comment: brother smokes outside     Alcohol use No     Family History   Problem Relation Age of Onset     Hypertension Mother      Hypertension Father      CANCER Maternal Grandmother      breast cancer     C.A.D. Maternal Grandmother      CANCER Paternal Grandmother      thyroid cancer         Current Outpatient Prescriptions   Medication Sig Dispense Refill     amphetamine-dextroamphetamine (ADDERALL XR) 10 MG per 24 hr capsule Take 1 capsule (10 mg) by mouth daily 7 capsule 0     methylphenidate ER (CONCERTA) 27 MG CR tablet Take 1 tablet (27 mg) by mouth every morning 30 tablet 0     No Known Allergies    Reviewed and updated as needed this visit by clinical staff  Tobacco  Allergies  Fam Hx  Soc Hx      Reviewed and updated as needed this visit by Provider         ROS:  C: NEGATIVE for fever, chills, change in weight  R: NEGATIVE for significant cough or SOB  CV: NEGATIVE for chest pain, palpitations or peripheral edema    OBJECTIVE:                                  "                   /82 (BP Location: Right arm, Patient Position: Chair, Cuff Size: Adult Regular)  Pulse 82  Temp 98.2  F (36.8  C) (Oral)  Resp 20  Ht 5' 9\" (1.753 m)  Wt 214 lb (97.1 kg)  SpO2 97%  BMI 31.6 kg/m2  Body mass index is 31.6 kg/(m^2).  GENERAL: healthy, alert and no distress  NECK: no adenopathy, no asymmetry, masses, or scars and thyroid normal to palpation  RESP: lungs clear to auscultation - no rales, rhonchi or wheezes  CV: regular rate and rhythm, normal S1 S2, no S3 or S4, no murmur, click or rub, no peripheral edema and peripheral pulses strong  MS: no gross musculoskeletal defects noted, no edema         ASSESSMENT/PLAN:                                                            1. Attention deficit hyperactivity disorder (ADHD), predominantly inattentive type  Pt has been on concerta with no improvement, and many side effects, pt will return the rest of the pills to the clinic.  Pt will start on adderall 10 mg for 1 week, then recheck in 1 week if dose it appropriate, if not may given another 7 days of 15 mg of adderall.  - amphetamine-dextroamphetamine (ADDERALL XR) 10 MG per 24 hr capsule; Take 1 capsule (10 mg) by mouth daily  Dispense: 7 capsule; Refill: 0    FUTURE APPOINTMENTS:       - Follow-up visit in 7 days by phone, or Mychart.    Brenden Piña MD  [facility]    "

## 2017-05-31 NOTE — MR AVS SNAPSHOT
"              After Visit Summary   5/31/2017    Jesus Crow    MRN: [id number]           Patient Information     Date Of Birth          1995        Visit Information        Provider Department      5/31/2017 11:00 AM Brenden Piña MD [facility]        Today's Diagnoses     Attention deficit hyperactivity disorder (ADHD), predominantly inattentive type    -  1       Follow-ups after your visit        Who to contact     If you have questions or need follow up information about today's clinic visit or your schedule please contact [facility] directly at [phone].  Normal or non-critical lab and imaging results will be communicated to you by Otel[employer]art, letter or phone within 4 business days after the clinic has received the results. If you do not hear from us within 7 days, please contact the clinic through [employer] or phone. If you have a critical or abnormal lab result, we will notify you by phone as soon as possible.  Submit refill requests through [employer] or call your pharmacy and they will forward the refill request to us. Please allow 3 business days for your refill to be completed.          Additional Information About Your Visit        MyChart Information     [employer] gives you secure access to your electronic health record. If you see a primary care provider, you can also send messages to your care team and make appointments. If you have questions, please call your primary care clinic.  If you do not have a primary care provider, please call [phone] and they will assist you.        Care EveryWhere ID     This is your Care EveryWhere ID. This could be used by other organizations to access your [facility] medical records  [id number]        Your Vitals Were     Pulse Temperature Respirations Height Pulse Oximetry BMI (Body Mass Index)    82 98.2  F (36.8  C) (Oral) 20 5' 9\" (1.753 m) 97% 31.6 kg/m2       Blood Pressure from Last 3 Encounters:   05/31/17 138/82 "   05/24/17 114/80   04/28/17 134/82    Weight from Last 3 Encounters:   05/31/17 214 lb (97.1 kg)   05/24/17 213 lb 1.6 oz (96.7 kg)   04/28/17 225 lb (102.1 kg)              Today, you had the following     No orders found for display         Today's Medication Changes          These changes are accurate as of: 5/31/17 11:32 AM.  If you have any questions, ask your nurse or doctor.               Start taking these medicines.        Dose/Directions    amphetamine-dextroamphetamine 10 MG per 24 hr capsule   Commonly known as:  ADDERALL XR   Used for:  Attention deficit hyperactivity disorder (ADHD), predominantly inattentive type   Started by:  Brenden Piña MD        Dose:  10 mg   Take 1 capsule (10 mg) by mouth daily   Quantity:  7 capsule   Refills:  0            Where to get your medicines      Some of these will need a paper prescription and others can be bought over the counter.  Ask your nurse if you have questions.     Bring a paper prescription for each of these medications     amphetamine-dextroamphetamine 10 MG per 24 hr capsule                Primary Care Provider Office Phone # Fax #    Brenden Piña [phone]       [address] [address]        Thank you!     Thank you for choosing [facility]  for your care. Our goal is always to provide you with excellent care. Hearing back from our patients is one way we can continue to improve our services. Please take a few minutes to complete the written survey that you may receive in the mail after your visit with us. Thank you!             Your Updated Medication List - Protect others around you: Learn how to safely use, store and throw away your medicines at www.disposemymeds.org.          This list is accurate as of: 5/31/17 11:32 AM.  Always use your most recent med list.                   Brand Name Dispense Instructions for use    amphetamine-dextroamphetamine 10 MG per 24 hr capsule     ADDERALL XR    7 capsule    Take 1 capsule (10 mg) by mouth daily       methylphenidate ER 27 MG CR tablet    CONCERTA    30 tablet    Take 1 tablet (27 mg) by mouth every morning

## 2017-06-04 ENCOUNTER — MYC MEDICAL ADVICE (OUTPATIENT)
Dept: FAMILY MEDICINE | Facility: CLINIC | Age: 22
End: 2017-06-04

## 2017-06-04 DIAGNOSIS — F90.0 ATTENTION DEFICIT HYPERACTIVITY DISORDER (ADHD), PREDOMINANTLY INATTENTIVE TYPE: ICD-10-CM

## 2017-06-05 RX ORDER — DEXTROAMPHETAMINE SACCHARATE, AMPHETAMINE ASPARTATE MONOHYDRATE, DEXTROAMPHETAMINE SULFATE AND AMPHETAMINE SULFATE 3.75; 3.75; 3.75; 3.75 MG/1; MG/1; MG/1; MG/1
15 CAPSULE, EXTENDED RELEASE ORAL DAILY
Qty: 7 CAPSULE | Refills: 0 | Status: SHIPPED | OUTPATIENT
Start: 2017-06-05 | End: 2017-06-13

## 2017-06-12 ENCOUNTER — MYC MEDICAL ADVICE (OUTPATIENT)
Dept: FAMILY MEDICINE | Facility: CLINIC | Age: 22
End: 2017-06-12

## 2017-06-12 DIAGNOSIS — F90.0 ATTENTION DEFICIT HYPERACTIVITY DISORDER (ADHD), PREDOMINANTLY INATTENTIVE TYPE: Primary | ICD-10-CM

## 2017-06-12 NOTE — TELEPHONE ENCOUNTER
Dr. Piña-see Armasightt message below.  Please advise.  Norma Rivera, RN    Brenden Piña MD   to Jesus Crow           7:48 AM   HI Jesus, no problem, lets go up on the dose to 15 mg.   I will give you another 7 days supply, then let me know if it is working, and this is the right dose for you.   If so, then will give you a month supply.   Brenden Piña MD   [facility]   [phone]      Last read by Jesus Crow at 10:30 AM on 6/5/2017.

## 2017-06-13 ENCOUNTER — MYC MEDICAL ADVICE (OUTPATIENT)
Dept: FAMILY MEDICINE | Facility: CLINIC | Age: 22
End: 2017-06-13

## 2017-06-13 DIAGNOSIS — F90.0 ATTENTION DEFICIT HYPERACTIVITY DISORDER (ADHD), PREDOMINANTLY INATTENTIVE TYPE: Primary | ICD-10-CM

## 2017-06-13 RX ORDER — DEXTROAMPHETAMINE SACCHARATE, AMPHETAMINE ASPARTATE MONOHYDRATE, DEXTROAMPHETAMINE SULFATE AND AMPHETAMINE SULFATE 5; 5; 5; 5 MG/1; MG/1; MG/1; MG/1
20 CAPSULE, EXTENDED RELEASE ORAL DAILY
Qty: 7 CAPSULE | Refills: 0 | Status: SHIPPED | OUTPATIENT
Start: 2017-06-13 | End: 2017-06-20

## 2017-06-13 RX ORDER — DEXTROAMPHETAMINE SACCHARATE, AMPHETAMINE ASPARTATE MONOHYDRATE, DEXTROAMPHETAMINE SULFATE AND AMPHETAMINE SULFATE 6.25; 6.25; 6.25; 6.25 MG/1; MG/1; MG/1; MG/1
25 CAPSULE, EXTENDED RELEASE ORAL DAILY
Qty: 7 CAPSULE | Refills: 0 | Status: SHIPPED | OUTPATIENT
Start: 2017-06-14 | End: 2017-07-14

## 2017-06-19 ENCOUNTER — MYC MEDICAL ADVICE (OUTPATIENT)
Dept: FAMILY MEDICINE | Facility: CLINIC | Age: 22
End: 2017-06-19

## 2017-06-20 ENCOUNTER — MYC MEDICAL ADVICE (OUTPATIENT)
Dept: FAMILY MEDICINE | Facility: CLINIC | Age: 22
End: 2017-06-20

## 2017-06-20 DIAGNOSIS — F90.0 ATTENTION DEFICIT HYPERACTIVITY DISORDER (ADHD), PREDOMINANTLY INATTENTIVE TYPE: ICD-10-CM

## 2017-06-20 RX ORDER — DEXTROAMPHETAMINE SACCHARATE, AMPHETAMINE ASPARTATE MONOHYDRATE, DEXTROAMPHETAMINE SULFATE AND AMPHETAMINE SULFATE 7.5; 7.5; 7.5; 7.5 MG/1; MG/1; MG/1; MG/1
30 CAPSULE, EXTENDED RELEASE ORAL DAILY
Qty: 30 CAPSULE | Refills: 0 | Status: SHIPPED | OUTPATIENT
Start: 2017-06-20 | End: 2017-07-19

## 2017-07-19 ENCOUNTER — OFFICE VISIT (OUTPATIENT)
Dept: FAMILY MEDICINE | Facility: CLINIC | Age: 22
End: 2017-07-19
Payer: COMMERCIAL

## 2017-07-19 VITALS
DIASTOLIC BLOOD PRESSURE: 84 MMHG | SYSTOLIC BLOOD PRESSURE: 129 MMHG | RESPIRATION RATE: 16 BRPM | OXYGEN SATURATION: 99 % | HEIGHT: 69 IN | HEART RATE: 68 BPM | WEIGHT: 204 LBS | TEMPERATURE: 98 F | BODY MASS INDEX: 30.21 KG/M2

## 2017-07-19 DIAGNOSIS — F90.0 ATTENTION DEFICIT HYPERACTIVITY DISORDER (ADHD), PREDOMINANTLY INATTENTIVE TYPE: ICD-10-CM

## 2017-07-19 PROCEDURE — 99213 OFFICE O/P EST LOW 20 MIN: CPT | Performed by: FAMILY MEDICINE

## 2017-07-19 RX ORDER — DEXTROAMPHETAMINE SACCHARATE, AMPHETAMINE ASPARTATE MONOHYDRATE, DEXTROAMPHETAMINE SULFATE AND AMPHETAMINE SULFATE 7.5; 7.5; 7.5; 7.5 MG/1; MG/1; MG/1; MG/1
30 CAPSULE, EXTENDED RELEASE ORAL DAILY
Qty: 30 CAPSULE | Refills: 0 | Status: SHIPPED | OUTPATIENT
Start: 2017-07-19 | End: 2017-08-17

## 2017-07-19 NOTE — MR AVS SNAPSHOT
After Visit Summary   7/19/2017    Jesus Crow    MRN: [id number]           Patient Information     Date Of Birth          1995        Visit Information        Provider Department      7/19/2017 10:30 AM Brenden Piña MD [facility]        Today's Diagnoses     Attention deficit hyperactivity disorder (ADHD), predominantly inattentive type           Follow-ups after your visit        Additional Services     MENTAL HEALTH REFERRAL       Your provider has referred you to: PREFERRED PROVIDERS: Yess[facility] associate [phone]    Please be aware that coverage of these services is subject to the terms and limitations of your health insurance plan.  Call member services at your health plan with any benefit or coverage questions.      Please bring the following to your appointment:  >>   Any x-rays, CTs or MRIs which have been performed.  Contact the facility where they were done to arrange for  prior to your scheduled appointment.  Any new CT, MRI or other procedures ordered by your specialist must be performed at a [facility] facility or coordinated by your clinic's referral office.    >>   List of current medications   >>   This referral request   >>   Any documents/labs given to you for this referral                  Who to contact     If you have questions or need follow up information about today's clinic visit or your schedule please contact [facility] directly at [phone].  Normal or non-critical lab and imaging results will be communicated to you by MyChart, letter or phone within 4 business days after the clinic has received the results. If you do not hear from us within 7 days, please contact the clinic through MyChart or phone. If you have a critical or abnormal lab result, we will notify you by phone as soon as possible.  Submit refill requests through [employer] or call your pharmacy and they will forward the refill request to us. Please allow 3  "business days for your refill to be completed.          Additional Information About Your Visit        MyChart Information     [employer] gives you secure access to your electronic health record. If you see a primary care provider, you can also send messages to your care team and make appointments. If you have questions, please call your primary care clinic.  If you do not have a primary care provider, please call [phone] and they will assist you.        Care EveryWhere ID     This is your Care EveryWhere ID. This could be used by other organizations to access your [place] medical records  [id number]        Your Vitals Were     Pulse Temperature Respirations Height Pulse Oximetry BMI (Body Mass Index)    68 98  F (36.7  C) (Oral) 16 5' 9\" (1.753 m) 99% 30.13 kg/m2       Blood Pressure from Last 3 Encounters:   07/19/17 129/84   05/31/17 138/82   05/24/17 114/80    Weight from Last 3 Encounters:   07/19/17 204 lb (92.5 kg)   05/31/17 214 lb (97.1 kg)   05/24/17 213 lb 1.6 oz (96.7 kg)              We Performed the Following     MENTAL HEALTH REFERRAL          Today's Medication Changes          These changes are accurate as of: 7/19/17 11:02 AM.  If you have any questions, ask your nurse or doctor.               Stop taking these medicines if you haven't already. Please contact your care team if you have questions.     methylphenidate ER 27 MG CR tablet   Commonly known as:  CONCERTA   Stopped by:  Brenden Piña MD                Where to get your medicines      Some of these will need a paper prescription and others can be bought over the counter.  Ask your nurse if you have questions.     Bring a paper prescription for each of these medications     amphetamine-dextroamphetamine 30 MG per 24 hr capsule                Primary Care Provider Office Phone # Fax #    Brenden Piña [phone]       [facility] [address] [facility] [address]        Equal Access to Services     [facility] " GAAR : Hadii primo landrum robert Bai, waaxda luqadaha, qaybta kadiazda makenna, waxpennie roxane hayraquel matthewsnikkidenton howard . So [facility] [phone].    ATENCIÓN: Si habla español, tiene a pro disposición servicios gratuitos de asistencia lingüística. Llame al [phone].    We comply with applicable federal civil rights laws and [place] laws. We do not discriminate on the basis of race, color, national origin, age, disability sex, sexual orientation or gender identity.            Thank you!     Thank you for choosing [facility]  for your care. Our goal is always to provide you with excellent care. Hearing back from our patients is one way we can continue to improve our services. Please take a few minutes to complete the written survey that you may receive in the mail after your visit with us. Thank you!             Your Updated Medication List - Protect others around you: Learn how to safely use, store and throw away your medicines at www.disposemymeds.org.          This list is accurate as of: 7/19/17 11:02 AM.  Always use your most recent med list.                   Brand Name Dispense Instructions for use Diagnosis    amphetamine-dextroamphetamine 30 MG per 24 hr capsule    ADDERALL XR    30 capsule    Take 1 capsule (30 mg) by mouth daily    Attention deficit hyperactivity disorder (ADHD), predominantly inattentive type

## 2017-07-19 NOTE — NURSING NOTE
"Chief Complaint   Patient presents with     Recheck Medication     Adderall     Refill Request       Initial /84 (BP Location: Right arm, Patient Position: Chair, Cuff Size: Adult Large)  Pulse 68  Temp 98  F (36.7  C) (Oral)  Resp 16  Ht 5' 9\" (1.753 m)  Wt 204 lb (92.5 kg)  SpO2 99%  BMI 30.13 kg/m2 Estimated body mass index is 30.13 kg/(m^2) as calculated from the following:    Height as of this encounter: 5' 9\" (1.753 m).    Weight as of this encounter: 204 lb (92.5 kg).  Medication Reconciliation: complete   Pastora Rust, CHRISTO      "

## 2017-07-19 NOTE — PROGRESS NOTES
SUBJECTIVE:                                                    Jesus Crow is a 21 year old male who presents to clinic today for the following health issues:      Medication Followup of Adderall    Taking Medication as prescribed: yes    Side Effects:  None    Medication Helping Symptoms:  yes         Pt ADHD has improved and is feeling that the attention span, distractibility, and forgetfulness are under better control ,no adverse effect of the medication, pt has been taking pain medicine regularly, and is consistent  with his refills request  Pt wishes to increase his dose to add 15 mg of adderall short release in the morning while he is getting ready to work.    Problem list and histories reviewed & adjusted, as indicated.  Additional history: as documented    Patient Active Problem List   Diagnosis     Allergic rhinitis     Chronic daily headache     Keratosis pilaris     Insomnia     Attention deficit hyperactivity disorder (ADHD), predominantly inattentive type     Past Surgical History:   Procedure Laterality Date     NO HISTORY OF SURGERY         Social History   Substance Use Topics     Smoking status: Passive Smoke Exposure - Never Smoker     Smokeless tobacco: Never Used      Comment: works at [employer]     Alcohol use No     Family History   Problem Relation Age of Onset     Hypertension Mother      Hypertension Father      CANCER Maternal Grandmother      breast cancer     C.A.D. Maternal Grandmother      CANCER Paternal Grandmother      thyroid cancer             Reviewed and updated as needed this visit by clinical staffTobacco  Allergies  Med Hx  Surg Hx  Fam Hx       Reviewed and updated as needed this visit by Provider         ROS:  C: NEGATIVE for fever, chills, change in weight  CV: NEGATIVE for chest pain, palpitations or peripheral edema    OBJECTIVE:     /84 (BP Location: Right arm, Patient Position: Chair, Cuff Size: Adult Large)  Pulse 68  Temp 98  F (36.7  C) (Oral)   "Resp 16  Ht 5' 9\" (1.753 m)  Wt 204 lb (92.5 kg)  SpO2 99%  BMI 30.13 kg/m2  Body mass index is 30.13 kg/(m^2).  GENERAL: healthy, alert and no distress  RESP: lungs clear to auscultation - no rales, rhonchi or wheezes  CV: regular rate and rhythm, normal S1 S2, no S3 or S4, no murmur, click or rub, no peripheral edema and peripheral pulses strong  MS: no gross musculoskeletal defects noted, no edema        ASSESSMENT/PLAN:             1. Attention deficit hyperactivity disorder (ADHD), predominantly inattentive type  Will refil for 1 month,   - amphetamine-dextroamphetamine (ADDERALL XR) 30 MG per 24 hr capsule; Take 1 capsule (30 mg) by mouth daily  Dispense: 30 capsule; Refill: 0  Refer to   - MENTAL HEALTH REFERRAL    Follow up with psychiatry.    Brenden Piña MD  [facility]"

## 2017-08-16 ENCOUNTER — MYC MEDICAL ADVICE (OUTPATIENT)
Dept: FAMILY MEDICINE | Facility: CLINIC | Age: 22
End: 2017-08-16

## 2017-08-16 DIAGNOSIS — F90.0 ATTENTION DEFICIT HYPERACTIVITY DISORDER (ADHD), PREDOMINANTLY INATTENTIVE TYPE: ICD-10-CM

## 2017-08-17 RX ORDER — DEXTROAMPHETAMINE SACCHARATE, AMPHETAMINE ASPARTATE MONOHYDRATE, DEXTROAMPHETAMINE SULFATE AND AMPHETAMINE SULFATE 7.5; 7.5; 7.5; 7.5 MG/1; MG/1; MG/1; MG/1
30 CAPSULE, EXTENDED RELEASE ORAL DAILY
Qty: 30 CAPSULE | Refills: 0 | Status: SHIPPED | OUTPATIENT
Start: 2017-08-17 | End: 2017-09-18

## 2017-08-17 NOTE — TELEPHONE ENCOUNTER
Can we please call patient, he suppose to see Psychiatry, did he get in yet?  Brenden Piña MD  [facility]  [phone]

## 2017-08-17 NOTE — TELEPHONE ENCOUNTER
Disp Refills Start End CARLOS   amphetamine-dextroamphetamine (ADDERALL XR) 30 MG per 24 hr capsule 30 capsule 0 7/19/2017  No   Sig: Take 1 capsule (30 mg) by mouth daily   Class: Local Print           Last Written Prescription Date:  7.19.17  Last Fill Quantity: 30,   # refills: 0  Last Office Visit with [facility] or [facility] prescribing provider: 7.19.17  Future Office visit:       Routing refill request to provider for review/approval because:  Drug not on the [facility], [facility] or [facility] refill protocol or controlled substance   checked today, showed absolutely no controlled drugs filled in past year    Coral Grewal RN, BS  Clinical Nurse Triage.

## 2017-08-17 NOTE — TELEPHONE ENCOUNTER
Not yet im pretty sure the referal he gave me is out of my network. I just cannot afford that. If he could give me a referal for someone in the [facility] system when i called the [facility] they said they have someone practicing out of [place].      on desk    Coral Grewal RN, BS  Clinical Nurse Triage.

## 2017-08-17 NOTE — ASSESSMENT & PLAN NOTE
Patient is followed by KENA, AMER for ongoing prescription of stimulants.  All refills should be approved by this provider, or covering partner.    Medication(s): amphetamine-dextroamphetamine (ADDERALL XR) 30 MG per 24 hr capsule; Take 1 capsule (30 mg) by mouth daily  .   Maximum quantity per month: 30  Clinic visit frequency required: Q 6  months     Controlled substance agreement on file: No  Neuropsych evaluation for ADD completed:  No    Last [facility] website verification:  done on 8.17.17   [email or web address]

## 2017-09-07 ENCOUNTER — MYC MEDICAL ADVICE (OUTPATIENT)
Dept: FAMILY MEDICINE | Facility: CLINIC | Age: 22
End: 2017-09-07

## 2017-09-07 DIAGNOSIS — F90.0 ATTENTION DEFICIT HYPERACTIVITY DISORDER (ADHD), PREDOMINANTLY INATTENTIVE TYPE: Primary | ICD-10-CM

## 2017-09-07 NOTE — TELEPHONE ENCOUNTER
See our MyChart reply.    Dr. Piña , see 8/16/17 MyChart communication - do we need to order different referral than what was in chart previously?   Forrest Ingram, RN

## 2017-09-17 ENCOUNTER — MYC MEDICAL ADVICE (OUTPATIENT)
Dept: FAMILY MEDICINE | Facility: CLINIC | Age: 22
End: 2017-09-17

## 2017-09-17 DIAGNOSIS — F90.0 ATTENTION DEFICIT HYPERACTIVITY DISORDER (ADHD), PREDOMINANTLY INATTENTIVE TYPE: ICD-10-CM

## 2017-09-18 RX ORDER — DEXTROAMPHETAMINE SACCHARATE, AMPHETAMINE ASPARTATE MONOHYDRATE, DEXTROAMPHETAMINE SULFATE AND AMPHETAMINE SULFATE 7.5; 7.5; 7.5; 7.5 MG/1; MG/1; MG/1; MG/1
30 CAPSULE, EXTENDED RELEASE ORAL DAILY
Qty: 30 CAPSULE | Refills: 0 | Status: SHIPPED | OUTPATIENT
Start: 2017-09-18 | End: 2017-10-19

## 2017-09-18 NOTE — TELEPHONE ENCOUNTER
Please see [employer] message as FYI about psychiatrist.      Adderall      Last Written Prescription Date:  8/17/2017  Last Fill Quantity: 30,   # refills: 0  Last Office Visit with [facility], [facility] or [facility] prescribing provider: 7/19/2017  Future Office visit:       Routing refill request to provider for review/approval because:  Drug not on the [facility], [facility] or [facility] refill protocol or controlled substance      [place] reviewed:  reviewed- no concerns    Last filled:  8/17/2017, #30  7/19/2017, #30    [place]:  [email or web address]    Sherron JIMÉNEZ RN, BSN, PHN  [place] [facility] RN

## 2017-10-19 ENCOUNTER — MYC REFILL (OUTPATIENT)
Dept: FAMILY MEDICINE | Facility: CLINIC | Age: 22
End: 2017-10-19

## 2017-10-19 DIAGNOSIS — F90.0 ATTENTION DEFICIT HYPERACTIVITY DISORDER (ADHD), PREDOMINANTLY INATTENTIVE TYPE: ICD-10-CM

## 2017-10-19 RX ORDER — DEXTROAMPHETAMINE SACCHARATE, AMPHETAMINE ASPARTATE MONOHYDRATE, DEXTROAMPHETAMINE SULFATE AND AMPHETAMINE SULFATE 7.5; 7.5; 7.5; 7.5 MG/1; MG/1; MG/1; MG/1
30 CAPSULE, EXTENDED RELEASE ORAL DAILY
Qty: 30 CAPSULE | Refills: 0 | Status: SHIPPED | OUTPATIENT
Start: 2017-10-19 | End: 2017-11-19

## 2017-10-19 NOTE — TELEPHONE ENCOUNTER
Message from [facility]:  Original authorizing provider: MD Jesus Tolliver would like a refill of the following medications:  amphetamine-dextroamphetamine (ADDERALL XR) 30 MG per 24 hr capsule [Brenden Piña MD]    Preferred pharmacy: [facility] [id number] [place] [address] [address] [facility] [address]    Comment:

## 2017-11-02 ENCOUNTER — OFFICE VISIT (OUTPATIENT)
Dept: PSYCHIATRY | Facility: CLINIC | Age: 22
End: 2017-11-02
Payer: COMMERCIAL

## 2017-11-02 VITALS
BODY MASS INDEX: 26.81 KG/M2 | HEIGHT: 69 IN | WEIGHT: 181 LBS | HEART RATE: 123 BPM | SYSTOLIC BLOOD PRESSURE: 128 MMHG | DIASTOLIC BLOOD PRESSURE: 80 MMHG | OXYGEN SATURATION: 98 % | TEMPERATURE: 98.3 F

## 2017-11-02 DIAGNOSIS — F90.0 ATTENTION DEFICIT HYPERACTIVITY DISORDER (ADHD), PREDOMINANTLY INATTENTIVE TYPE: Primary | ICD-10-CM

## 2017-11-02 PROCEDURE — 90792 PSYCH DIAG EVAL W/MED SRVCS: CPT | Performed by: NURSE PRACTITIONER

## 2017-11-02 RX ORDER — DEXTROAMPHETAMINE SACCHARATE, AMPHETAMINE ASPARTATE, DEXTROAMPHETAMINE SULFATE AND AMPHETAMINE SULFATE 2.5; 2.5; 2.5; 2.5 MG/1; MG/1; MG/1; MG/1
10 TABLET ORAL
Qty: 30 TABLET | Refills: 0 | Status: SHIPPED | OUTPATIENT
Start: 2017-11-02 | End: 2017-11-20

## 2017-11-02 ASSESSMENT — ANXIETY QUESTIONNAIRES
2. NOT BEING ABLE TO STOP OR CONTROL WORRYING: NOT AT ALL
GAD7 TOTAL SCORE: 1
1. FEELING NERVOUS, ANXIOUS, OR ON EDGE: NOT AT ALL
3. WORRYING TOO MUCH ABOUT DIFFERENT THINGS: NOT AT ALL
6. BECOMING EASILY ANNOYED OR IRRITABLE: NOT AT ALL
7. FEELING AFRAID AS IF SOMETHING AWFUL MIGHT HAPPEN: NOT AT ALL
IF YOU CHECKED OFF ANY PROBLEMS ON THIS QUESTIONNAIRE, HOW DIFFICULT HAVE THESE PROBLEMS MADE IT FOR YOU TO DO YOUR WORK, TAKE CARE OF THINGS AT HOME, OR GET ALONG WITH OTHER PEOPLE: NOT DIFFICULT AT ALL
5. BEING SO RESTLESS THAT IT IS HARD TO SIT STILL: SEVERAL DAYS

## 2017-11-02 ASSESSMENT — PATIENT HEALTH QUESTIONNAIRE - PHQ9
5. POOR APPETITE OR OVEREATING: NOT AT ALL
SUM OF ALL RESPONSES TO PHQ QUESTIONS 1-9: 2

## 2017-11-02 NOTE — MR AVS SNAPSHOT
After Visit Summary   11/2/2017    Jesus Crow    MRN: [id number]           Patient Information     Date Of Birth          1995        Visit Information        Provider Department      11/2/2017 10:45 AM Maryse Dos Santos NP [facility]        Today's Diagnoses     Attention deficit hyperactivity disorder (ADHD), predominantly inattentive type    -  1      Care Instructions    Treatment Plan:    Continue Adderall (amphetamine salts) XR 30 mg by mouth daily in AM.    Add Adderall (amphetamine salts) IR 10 mg by mouth daily at Noon. May consider dose increase if needed.     Continue all other medications as reviewed per electronic medical record today.     Safety plan reviewed. To the Emergency Department as needed or call after hours crisis line at [phone] or [phone].     To schedule individual or family therapy, call [facility] at [phone].   Thank you for our work together in the Psychiatry Collaborative Care Model at [facility]. This is our last visit and I am returning your care back to your Primary Care Provider Brenden Piña MD . If you are not doing well, please contact your Primary Care Provider office.     Follow up with primary care provider as planned or for acute medical concerns.    I recommend connecting with PCP in 2-4 weeks for a follow up.           Follow-ups after your visit        Follow-up notes from your care team     Return if symptoms worsen or fail to improve.      Who to contact     If you have questions or need follow up information about today's clinic visit or your schedule please contact [facility] directly at [phone].  Normal or non-critical lab and imaging results will be communicated to you by MyChart, letter or phone within 4 business days after the clinic has received the results. If you do not hear from us within 7 days, please contact the clinic through MyChart or phone. If you  "have a critical or abnormal lab result, we will notify you by phone as soon as possible.  Submit refill requests through [employer] or call your pharmacy and they will forward the refill request to us. Please allow 3 business days for your refill to be completed.          Additional Information About Your Visit        [employer] Information     [employer] gives you secure access to your electronic health record. If you see a primary care provider, you can also send messages to your care team and make appointments. If you have questions, please call your primary care clinic.  If you do not have a primary care provider, please call [phone] and they will assist you.        Care EveryWhere ID     This is your Care EveryWhere ID. This could be used by other organizations to access your [facility] medical records  [id number]        Your Vitals Were     Pulse Temperature Height Pulse Oximetry BMI (Body Mass Index)       123 98.3  F (36.8  C) (Oral) 5' 9\" (1.753 m) 98% 26.73 kg/m2        Blood Pressure from Last 3 Encounters:   11/02/17 128/80   07/19/17 129/84   05/31/17 138/82    Weight from Last 3 Encounters:   11/02/17 181 lb (82.1 kg)   07/19/17 204 lb (92.5 kg)   05/31/17 214 lb (97.1 kg)              Today, you had the following     No orders found for display         Today's Medication Changes          These changes are accurate as of: 11/2/17 11:26 AM.  If you have any questions, ask your nurse or doctor.               These medicines have changed or have updated prescriptions.        Dose/Directions    * amphetamine-dextroamphetamine 30 MG per 24 hr capsule   Commonly known as:  ADDERALL XR   This may have changed:  Another medication with the same name was added. Make sure you understand how and when to take each.   Used for:  Attention deficit hyperactivity disorder (ADHD), predominantly inattentive type   Changed by:  Brenden Piña MD        Dose:  30 mg   Take 1 capsule (30 mg) by mouth daily   Quantity:  30 capsule "   Refills:  0       * amphetamine-dextroamphetamine 10 MG per tablet   Commonly known as:  ADDERALL   This may have changed:  You were already taking a medication with the same name, and this prescription was added. Make sure you understand how and when to take each.   Used for:  Attention deficit hyperactivity disorder (ADHD), predominantly inattentive type   Changed by:  Maryse Dos Santos NP        Dose:  10 mg   Take 1 tablet (10 mg) by mouth daily (before lunch)   Quantity:  30 tablet   Refills:  0       * Notice:  This list has 2 medication(s) that are the same as other medications prescribed for you. Read the directions carefully, and ask your doctor or other care provider to review them with you.         Where to get your medicines      Some of these will need a paper prescription and others can be bought over the counter.  Ask your nurse if you have questions.     Bring a paper prescription for each of these medications     amphetamine-dextroamphetamine 10 MG per tablet                Primary Care Provider Office Phone # Fax #    Brenden Piña -[phone] [address] [facility] [address]        Equal Access to Services     [facility] Center[facility] : Hadii primo stallingso [place], waaxda luqadaha, qaybta kaalmada adegrazynayaconor, kirit howard . So [facility] [phone].    ATENCIÓN: Si habla español, tiene a pro disposición servicios gratuitos de asistencia lingüística. Llame al [phone].    We comply with applicable federal civil rights laws and [place] laws. We do not discriminate on the basis of race, color, national origin, age, disability, sex, sexual orientation, or gender identity.            Thank you!     Thank you for choosing [facility]  for your care. Our goal is always to provide you with excellent care. Hearing back from our patients is one way we can continue to improve our services. Please take a few minutes to complete the written survey  that you may receive in the mail after your visit with us. Thank you!             Your Updated Medication List - Protect others around you: Learn how to safely use, store and throw away your medicines at www.disposemymeds.org.          This list is accurate as of: 11/2/17 11:26 AM.  Always use your most recent med list.                   Brand Name Dispense Instructions for use Diagnosis    * amphetamine-dextroamphetamine 30 MG per 24 hr capsule    ADDERALL XR    30 capsule    Take 1 capsule (30 mg) by mouth daily    Attention deficit hyperactivity disorder (ADHD), predominantly inattentive type       * amphetamine-dextroamphetamine 10 MG per tablet    ADDERALL    30 tablet    Take 1 tablet (10 mg) by mouth daily (before lunch)    Attention deficit hyperactivity disorder (ADHD), predominantly inattentive type       * Notice:  This list has 2 medication(s) that are the same as other medications prescribed for you. Read the directions carefully, and ask your doctor or other care provider to review them with you.

## 2017-11-02 NOTE — Clinical Note
Jorge Piña Please see psychiatry consult. More details in my note. I am returning care back to you. Maryse

## 2017-11-02 NOTE — PROGRESS NOTES
"                                                         Outpatient Psychiatric Evaluation  Adult    Name:  Jesus Crow  : 1995    Source of Referral:  Primary Care Provider: Brenden Piña MD - last visit 2017  Current Psychotherapist: None -     Identifying Data:  Patient is a 22 year old year old, single  White American male  who presents for initial visit with me.  Patient is currently employed full time and will be starting college. Patient attended the session alone. Consent to communicate signed for Aracelis Gaytan patient's Mother. Consent for treatment signed and included in electronic medical record. Discussed limits of confidentiality today. My Practice Policy was reviewed and signed.    Chief Complaint:  Consultation.  Patient reports: \"My Doctor isn't comfortable dealing with my Attention Deficit Hyperactivity Disorder (ADHD) medication\"  Patient prefers to be called: \"Max\"    HPI:  Reports the Adderall (amphetamine salts) XR lasts about 10 hours, but struggles with the rest of his day at work.   Recent diagnosis of Attention Deficit Hyperactivity Disorder (ADHD) formal assessment reviewed per [facility].   Past diagnoses include: Attention Deficit Hyperactivity Disorder (ADHD), Insomnia  Current medications include: Adderall (amphetamine salts) XR 30 mg daily    Medication side effects: Denies  Current stressors include: Symptoms  Coping mechanisms and supports include: Family, Occupation, Deep Breathing, Exercise    Psychiatric Review of Symptoms:  Depression: Appetite: Decrease some    Concentration: Decrease   PHQ-9 scores:   PHQ-9 SCORE 2017   Total Score 7 2     Nathalia:  Distractibility: Increase    Impulsiveness: Increase    Racing Thoughts: Increase   MDQ Score: Negative Screen  Anxiety: Restlessness    WILFREDO-7 scores:    WILFREDO-7 SCORE 2017   Total Score 2 1     Panic:  No symptoms   Agoraphobia:  No   PTSD:  No symptoms   OCD:  No symptoms   Psychosis: No " symptoms   ADD / ADHD: Attention Problem(s)    Problems with Listening    Task Completion Difficulties    Poor Organization    Distractible    Forgetful    Previous Diagnosis  Gambling or shoplifting: No   Eating Disorder:  No symptoms  Sleep:   No symptoms     Psychiatric History:   Hospitalizations: None  History of Commitment? No   Past Treatment: counseling, physician / PCP and medication(s) from physician / PCP  Suicide Attempts: No   Current Suicide Risk:  Suicide Assessment Completed Today.  Self-injurious Behavior: Denies  Electroconvulsive Therapy (ECT) or Transcranial Magnetic Stimulation (TMS): No   GeneSight Genetic Testing: No     Past medication trials include but are not limited to:   Adderall (amphetamine salts) IR and XR  Concerta (methylphenidate) 27 mg was not helpful  Elavil (amitriptyline) for headaches in the past   Melatonin was not helpful   Benadryl (diphenhydramine) for sleep was not helpful    Substance Use History:  Current use of drugs or alcohol: Denies - quit drinking alcohol when started medications  Patient reports no problems as a result of their drinking / drug use.   Based on the clinical interview, there  are not indications of drug or alcohol abuse.  Tobacco use: Yes E-cigarettes  History - minor tobacco cravings  Caffeine: No  Patient has not received chemical dependency treatment in the past  Recovery Programming Involvement: Not Applicable    Past Medical History:  Past Medical History:   Diagnosis Date     Attention deficit hyperactivity disorder (ADHD), predominantly inattentive type 5/31/2017      Surgery:   Past Surgical History:   Procedure Laterality Date     NO HISTORY OF SURGERY       Allergies:   No Known Allergies  Primary Care Provider: Brenden Piña MD  Seizures or Head Injury: No  Diet: No Restrictions  Food Allergies: No   Exercise: Walking, Mixed Martial Arts  Supplements: Reviewed per Electronic Medical Record Today    Current Medications:    Current Outpatient  "Prescriptions:      amphetamine-dextroamphetamine (ADDERALL XR) 30 MG per 24 hr capsule, Take 1 capsule (30 mg) by mouth daily, Disp: 30 capsule, Rfl: 0    The [place] Prescription Monitoring Program has been reviewed and there are no concerns about diversionary activity for controlled substances at this time.      Vital Signs:  Vitals: /80 (BP Location: Left arm, Patient Position: Chair, Cuff Size: Adult Regular)  Pulse 123  Temp 98.3  F (36.8  C) (Oral)  Ht 5' 9\" (1.753 m)  Wt 181 lb (82.1 kg)  SpO2 98%  BMI 26.73 kg/m2    Labs:  Most recent laboratory results reviewed and the pertinent results include:   No visits with results within 6 Month(s) from this visit.  Latest known visit with results is:    Office Visit on 11/15/2010   Component Date Value Ref Range Status     Cholesterol 11/15/2010 162  0 - 200 mg/dL Final    Comment: LDL Cholesterol is the primary guide to therapy.                            The NCEP recommends further evaluation of: patients with cholesterol <200                            mg/dL                            if additional risk factors are present, cholesterol >240 mg/dL, triglycerides                            >150 mg/dL, or HDL <40 mg/dL.     Triglycerides 11/15/2010 140  0 - 150 mg/dL Final     HDL Cholesterol 11/15/2010 27* 40 - 110 mg/dL Final     LDL Cholesterol Calculated 11/15/2010 108  0 - 129 mg/dL Final    Comment: LDL Cholesterol is the primary guide to therapy: LDL-cholesterol goal in high                            risk patients is <100 mg/dL and in very high risk patients is <70 mg/dL.     VLDL-Cholesterol 11/15/2010 28  0 - 30 mg/dL Final     Cholesterol/HDL Ratio 11/15/2010 6.1* 0.0 - 5.0 Final      No EKG on file.     Review of Systems:  10 systems (general, cardiovascular, respiratory, eyes, ENT, endocrine, GI, , M/S, neurological) were reviewed. Most pertinent finding(s) is/are: seasonal allergies, history of headaches, dry mouth. The remaining " systems are all unremarkable.    Family History:   Patient reported family history includes:   Family History   Problem Relation Age of Onset     Hypertension Mother      Hypertension Father      CANCER Maternal Grandmother      breast cancer     C.A.D. Maternal Grandmother      CANCER Paternal Grandmother      thyroid cancer     Mental Illness History: Yes: Mother with Depression and Anxiety  Substance Abuse History: Denies  Suicide History: Denies  Medications: Unknown     Social History:   Birth place: [place], [place]  Childhood: Yes intact home  Siblings:  two   Highest education level was 10th grade. Hopes to go back and get GED soon. Struggled with homework, did okay on tests and in school work.   Childhood illnesses: Ear Infections Headaches/Migraines Trouble with speech and motor skill delays  Current Living situation:  [place], [place] with Mother, Father and 2 neices and 1 nephew and 3 dogs. Feels safe at home.  Children: zero   Firearms/Weapons Access: No: Patient denies   Service: No    Legal History:  No: Patient denies any legal history    Significant Losses / Trauma / Abuse / Neglect Issues:  There are no indications or report of: significant losses, trauma, abuse or neglect.   Issues of possible neglect are not present.   A safety and risk management plan has not been developed at this time, however client was given the after-hours number / 911 should there be a change in any of these risk factors..    Mental Status Examination:     Appearance:  awake, alert, adequately groomed, appeared stated age, no apparent distress and normal weight  Attitude:  cooperative   Eye Contact:  good and wears glasses  Gait and Station: Normal, No assistive Devices used and No dizziness or falls  Psychomotor Behavior:  no evidence of tardive dyskinesia, dystonia, or tics  Oriented to:  time, person, and place  Attention Span and Concentration:  Normal  Speech:  clear, coherent, regular rate, regular rhythm and  fluent  Mood:  good  Affect:  appropriate and in normal range  Associations:  no loose associations  Thought Process:  logical, linear and goal oriented  Thought Content:  no evidence of suicidal ideation or homicidal ideation, no evidence of psychotic thought and Appropriate to Interview  Recent and Remote Memory:  intact to interview. Not formally assessed. No amnesia.  Fund of Knowledge: appropriate  Insight:  good  Judgment:  intact  Impulse Control:  intact    Suicide Risk Assessment:  Today Jesus Crow reports no history of mood or anxiety disorders. In addition, there are notable risk factors for self-harm, including age. However, risk is mitigated by commitment to family, sobriety, absence of past attempts, ability to volunteer a safety plan, history of seeking help when needed, future oriented, no access to firearms or weapons, denies suicidal intent or plan, no family history of suicide and denies homicidal ideation, intent, or plan. Therefore, based on all available evidence including the factors cited above, Jesus Crow does not appear to be at imminent risk for self-harm, does not meet criteria for a 72-hr hold, and therefore remains appropriate for ongoing outpatient level of care.  A thorough assessment of risk factors related to suicide and self-harm have been reviewed and are noted above. The patient convincingly denies acute suicidality on several occasions. Local community safety resources reviewed and printed for patient to use if needed. There was no deceit detected, and the patient presented in a manner that was believable.     DSM5  Diagnosis:  Attention-Deficit/Hyperactivity Disorder  314.00 (F90.0) Predominantly inattentive presentation   Insomnia     Medical Comorbidities Include:   Patient Active Problem List    Diagnosis Date Noted     Attention deficit hyperactivity disorder (ADHD), predominantly inattentive type 05/31/2017     Priority: Medium     Keratosis pilaris  11/06/2010     Priority: Medium     Insomnia 11/06/2010     Priority: Medium     Chronic daily headache 09/22/2009     Priority: Medium     Allergic rhinitis 12/12/2006     Priority: Medium     Problem list name updated by automated process. Provider to review         Psychosocial & Contextual Factors:  Phase of Life Difficulties    Strengths and Opportunities:   Jesus Crow identified the following strengths or resources that will help he succeed in counseling: commitment to health and well being, friends / good social support, family support, positive work environment and sense of humor. Things that may interfere with the patient's success include:  none noted at this time.    A 12-item WHODAS 2.0 assessment was completed by the patient today and recorded in EPIC.    WHODAS 2.0 TOTAL SCORES 11/2/2017   Total Score 13       The Patient Activation Measure (JOSUE) score was completed and recorded in [employer]. This assesses patient knowledge, skill, and confidence for self-management.   JOSUE Score (Last Two) 4/28/2017   JOSUE Raw Score 30   Activation Score 56   JOSUE Level 3        Impression:  Jesus Crow has been struggling with Attention Deficit Hyperactivity Disorder (ADHD) symptoms. Medication side effects and alternatives reviewed. Health promotion activities recommended and reviewed today. All questions addressed. Education and counseling completed regarding risks and benefits of medications and psychotherapy options. Collaborative Care Psychiatry Service model reviewed today. Recommend therapy for additional support. No mood or anxiety concerns. Sleep has been a chronic concern where he does not feel rested. May consider a sleep consult in the future to rule out Obstructive Sleep Apnea. May also consider trial of low dose Desyrel/Olepto (trazodone) 25- 50 mg at bedtime. Will add Adderall (amphetamine salts) IR dosing starting at 10 mg during mid day/early afternoon and can increase as needed and tolerated.  Patient is also able to take the IR in the morning and the XR in early afternoon as well. Can maximizing Adderall (amphetamine salts) dosing up to 60 mg daily. Prescription of Adderall (amphetamine salts) IR printed and given to patient today.  Discussed return back to Primary Care Provider. If needs long term psychiatry, can get referral from Primary Care Provider if needed. I believe he would be okay continuing with his Primary Care Provider.     Treatment Plan:    Continue Adderall (amphetamine salts) XR 30 mg by mouth daily in AM.    Add Adderall (amphetamine salts) IR 10 mg by mouth daily at Noon. May consider dose increase if needed.     Continue all other medications as reviewed per electronic medical record today.     Safety plan reviewed. To the Emergency Department as needed or call after hours crisis line at [phone] or [phone].     To schedule individual or family therapy, call [facility] at [phone].   Thank you for our work together in the Psychiatry Collaborative Care Model at [facility]. This is our last visit and I am returning your care back to your Primary Care Provider Brenden Piña MD . If you are not doing well, please contact your Primary Care Provider office.     Follow up with primary care provider as planned or for acute medical concerns.    I recommend connecting with PCP in 2-4 weeks for a follow up.     Community Resources:    National Suicide Prevention Lifeline: 704.728.4012 (TTY: 865.645.8850). Call anytime for help.  (www.suicidepreventionlifeline.org)  National [place] on Mental Illness (www.wendy.org): 273.376.8027 or 008-973-5544.   Mental Health Association (www.mentalhealth.org): 614.177.9786 or 271-478-4264.    Administrative Billing:   Time spent with patient was 60 minutes and greater than 50% of time or 40 minutes was spent in counseling and coordination of care.    Patient Status:  The patient is being returned to the referring  provider for ongoing care and medication prescribing.  The patient can be referred back to this service for further consultation as needed.    Signed:   Maryse Dos Santos, PhD, APRN, CNP  Psychiatry

## 2017-11-02 NOTE — NURSING NOTE
"Chief Complaint   Patient presents with     Consult     referred by Dr Piña- medication request for Adderal pt was diagnosed by Dr Tompkins. pt wanting to discuss option for medication coverage.       Initial /80 (BP Location: Left arm, Patient Position: Chair, Cuff Size: Adult Regular)  Pulse 123  Temp 98.3  F (36.8  C) (Oral)  Ht 5' 9\" (1.753 m)  Wt 181 lb (82.1 kg)  SpO2 98%  BMI 26.73 kg/m2 Estimated body mass index is 26.73 kg/(m^2) as calculated from the following:    Height as of this encounter: 5' 9\" (1.753 m).    Weight as of this encounter: 181 lb (82.1 kg).  Medication Reconciliation: complete    "

## 2017-11-03 ASSESSMENT — ANXIETY QUESTIONNAIRES: GAD7 TOTAL SCORE: 1

## 2017-11-19 ENCOUNTER — MYC REFILL (OUTPATIENT)
Dept: FAMILY MEDICINE | Facility: CLINIC | Age: 22
End: 2017-11-19

## 2017-11-19 DIAGNOSIS — F90.0 ATTENTION DEFICIT HYPERACTIVITY DISORDER (ADHD), PREDOMINANTLY INATTENTIVE TYPE: ICD-10-CM

## 2017-11-20 ENCOUNTER — MYC MEDICAL ADVICE (OUTPATIENT)
Dept: FAMILY MEDICINE | Facility: CLINIC | Age: 22
End: 2017-11-20

## 2017-11-20 DIAGNOSIS — F90.0 ATTENTION DEFICIT HYPERACTIVITY DISORDER (ADHD), PREDOMINANTLY INATTENTIVE TYPE: ICD-10-CM

## 2017-11-20 RX ORDER — DEXTROAMPHETAMINE SACCHARATE, AMPHETAMINE ASPARTATE MONOHYDRATE, DEXTROAMPHETAMINE SULFATE AND AMPHETAMINE SULFATE 7.5; 7.5; 7.5; 7.5 MG/1; MG/1; MG/1; MG/1
30 CAPSULE, EXTENDED RELEASE ORAL DAILY
Qty: 12 CAPSULE | Refills: 0 | Status: SHIPPED | OUTPATIENT
Start: 2017-11-20 | End: 2017-11-20

## 2017-11-20 RX ORDER — DEXTROAMPHETAMINE SACCHARATE, AMPHETAMINE ASPARTATE MONOHYDRATE, DEXTROAMPHETAMINE SULFATE AND AMPHETAMINE SULFATE 7.5; 7.5; 7.5; 7.5 MG/1; MG/1; MG/1; MG/1
30 CAPSULE, EXTENDED RELEASE ORAL DAILY
Qty: 30 CAPSULE | Refills: 0 | Status: SHIPPED | OUTPATIENT
Start: 2017-11-21 | End: 2017-12-20

## 2017-11-20 RX ORDER — DEXTROAMPHETAMINE SACCHARATE, AMPHETAMINE ASPARTATE, DEXTROAMPHETAMINE SULFATE AND AMPHETAMINE SULFATE 2.5; 2.5; 2.5; 2.5 MG/1; MG/1; MG/1; MG/1
10 TABLET ORAL
Qty: 30 TABLET | Refills: 0 | Status: SHIPPED | OUTPATIENT
Start: 2017-11-21 | End: 2017-11-20

## 2017-11-20 NOTE — TELEPHONE ENCOUNTER
Message from MyChart:  Original authorizing provider: MD Max Tolliver would like a refill of the following medications:  amphetamine-dextroamphetamine (ADDERALL XR) 30 MG per 24 hr capsule [Brenden Piña MD]    Preferred pharmacy: [facility] [id number] [place] [address] [address] [facility] [address]    Comment:

## 2017-11-20 NOTE — TELEPHONE ENCOUNTER
Will dismiss the adderall 10 mg prescription.  Will refill, adderall XR 30 mg, and he will be due for refills for both prescriptions (adderall XR and regular Adderall ) 12/20.  Brenden Piña MD  [facility]  [phone]

## 2017-12-20 ENCOUNTER — MYC MEDICAL ADVICE (OUTPATIENT)
Dept: FAMILY MEDICINE | Facility: CLINIC | Age: 22
End: 2017-12-20

## 2017-12-20 DIAGNOSIS — F90.0 ATTENTION DEFICIT HYPERACTIVITY DISORDER (ADHD), PREDOMINANTLY INATTENTIVE TYPE: ICD-10-CM

## 2017-12-20 RX ORDER — DEXTROAMPHETAMINE SACCHARATE, AMPHETAMINE ASPARTATE, DEXTROAMPHETAMINE SULFATE AND AMPHETAMINE SULFATE 2.5; 2.5; 2.5; 2.5 MG/1; MG/1; MG/1; MG/1
10 TABLET ORAL
Qty: 30 TABLET | Refills: 0 | Status: SHIPPED | OUTPATIENT
Start: 2017-12-20 | End: 2018-01-16

## 2017-12-20 RX ORDER — DEXTROAMPHETAMINE SACCHARATE, AMPHETAMINE ASPARTATE MONOHYDRATE, DEXTROAMPHETAMINE SULFATE AND AMPHETAMINE SULFATE 7.5; 7.5; 7.5; 7.5 MG/1; MG/1; MG/1; MG/1
30 CAPSULE, EXTENDED RELEASE ORAL DAILY
Qty: 30 CAPSULE | Refills: 0 | Status: SHIPPED | OUTPATIENT
Start: 2017-12-20 | End: 2018-01-16

## 2017-12-20 NOTE — TELEPHONE ENCOUNTER
I do not see overview or Controlled Substance agreement.  I did overview and  do you want CSA signed when picks up RX?  Also 7/19/17 visit advised 1 month recheck.  Not PSO med.  Sent to provider.  Norma Rivera, OANH    7/19/17  ASSESSMENT/PLAN:       1. Attention deficit hyperactivity disorder (ADHD), predominantly inattentive type  Will refil for 1 month,   - amphetamine-dextroamphetamine (ADDERALL XR) 30 MG per 24 hr capsule; Take 1 capsule (30 mg) by mouth daily  Dispense: 30 capsule; Refill: 0  Refer to   - MENTAL HEALTH REFERRAL     Follow up with psychiatry.     Brenden Piña MD  [facility]       Brenden Piña MD        11/20/17 2:39 PM   Note      Will dismiss the adderall 10 mg prescription.  Will refill, adderall XR 30 mg, and he will be due for refills for both prescriptions (adderall XR and regular Adderall ) 12/20.  Brenden Piña MD  [facility]  [phone]                     11/20/17 2:19 PM   Forrest Ingram, RN routed this conversation to Brenden Piña MD Matthew J Barnabo   to Brenden Piña MD           11/20/17 1:34 PM   Dr Piña, you misunderstand, i had 15 of the 30mg xr left, so i was waiting to start taking the 10mg with it untip i had the 30 refilled, i just need a refill on the 30mg.

## 2018-01-16 ENCOUNTER — MYC REFILL (OUTPATIENT)
Dept: FAMILY MEDICINE | Facility: CLINIC | Age: 23
End: 2018-01-16

## 2018-01-16 DIAGNOSIS — F90.0 ATTENTION DEFICIT HYPERACTIVITY DISORDER (ADHD), PREDOMINANTLY INATTENTIVE TYPE: ICD-10-CM

## 2018-01-16 RX ORDER — DEXTROAMPHETAMINE SACCHARATE, AMPHETAMINE ASPARTATE MONOHYDRATE, DEXTROAMPHETAMINE SULFATE AND AMPHETAMINE SULFATE 7.5; 7.5; 7.5; 7.5 MG/1; MG/1; MG/1; MG/1
30 CAPSULE, EXTENDED RELEASE ORAL DAILY
Qty: 30 CAPSULE | Refills: 0 | Status: SHIPPED | OUTPATIENT
Start: 2018-01-16 | End: 2018-02-21

## 2018-01-16 RX ORDER — DEXTROAMPHETAMINE SACCHARATE, AMPHETAMINE ASPARTATE, DEXTROAMPHETAMINE SULFATE AND AMPHETAMINE SULFATE 2.5; 2.5; 2.5; 2.5 MG/1; MG/1; MG/1; MG/1
10 TABLET ORAL
Qty: 30 TABLET | Refills: 0 | Status: SHIPPED | OUTPATIENT
Start: 2018-01-16 | End: 2018-02-21

## 2018-01-16 NOTE — TELEPHONE ENCOUNTER
Adderall XR 30mg      Last Written Prescription Date:  12/20/2017  Last Fill Quantity: 30,   # refills: 0  Last Office Visit: 7/19/2017  Future Office visit:       Routing refill request to provider for review/approval because:  Drug not on the [facility], [facility] or [facility] refill protocol or controlled substance      Adderall 10mg      Last Written Prescription Date:  12/20/2017  Last Fill Quantity: 30,   # refills: 0  Last Office Visit: 7/19/2017  Future Office visit:       Routing refill request to provider for review/approval because:  Drug not on the [facility], [facility] or [facility] refill protocol or controlled substance    [place] reviewed:  reviewed- no concerns    [place]  [email or web address]    Last Filled:  XR 30mg  12/22/2017, #30 (PCP)  11/20/2017, #30    10mg  12/22/2017, #30 (PCP)  11/2/2017, #30 ([facility])    Sherron JIMÉNEZ RN, BSN, PHN  [place] [facility] RN

## 2018-02-01 ENCOUNTER — OFFICE VISIT (OUTPATIENT)
Dept: FAMILY MEDICINE | Facility: CLINIC | Age: 23
End: 2018-02-01
Payer: COMMERCIAL

## 2018-02-01 VITALS
WEIGHT: 179 LBS | HEIGHT: 69 IN | SYSTOLIC BLOOD PRESSURE: 119 MMHG | OXYGEN SATURATION: 100 % | RESPIRATION RATE: 20 BRPM | BODY MASS INDEX: 26.51 KG/M2 | TEMPERATURE: 98.7 F | DIASTOLIC BLOOD PRESSURE: 81 MMHG | HEART RATE: 93 BPM

## 2018-02-01 DIAGNOSIS — L85.8 KERATOSIS PILARIS: Primary | ICD-10-CM

## 2018-02-01 DIAGNOSIS — R63.8 SYMPTOMS CONCERNING NUTRITION, METABOLISM, AND DEVELOPMENT: ICD-10-CM

## 2018-02-01 PROCEDURE — 99213 OFFICE O/P EST LOW 20 MIN: CPT | Performed by: FAMILY MEDICINE

## 2018-02-01 RX ORDER — AMMONIUM LACTATE 12 G/100G
CREAM TOPICAL 2 TIMES DAILY PRN
Qty: 385 G | Refills: 3 | Status: SHIPPED | OUTPATIENT
Start: 2018-02-01 | End: 2023-04-19

## 2018-02-01 NOTE — PROGRESS NOTES
"  SUBJECTIVE:   Jesus Crow is a 22 year old male who presents to clinic today for the following health issues:      Rash  Onset: for years    Description:   Location: over body  Character: raised  Itching (Pruritis): YES    Progression of Symptoms:  same    Accompanying Signs & Symptoms:  Fever: no   Body aches or joint pain: no   Sore throat symptoms: no   Recent cold symptoms: no     History:   Previous similar rash: YES    Precipitating factors:   Exposure to similar rash: no   New exposures: None   Recent travel: no     Alleviating factors:  Pt has been using soap less often.    Therapies Tried and outcome: none.    Pt also would like a referral to nutritionist to help choosing right diet for him.    Problem list and histories reviewed & adjusted, as indicated.  Additional history: as documented    Patient Active Problem List   Diagnosis     Allergic rhinitis     Chronic daily headache     Keratosis pilaris     Insomnia     Attention deficit hyperactivity disorder (ADHD), predominantly inattentive type     Past Surgical History:   Procedure Laterality Date     NO HISTORY OF SURGERY         Social History   Substance Use Topics     Smoking status: Passive Smoke Exposure - Never Smoker     Smokeless tobacco: Never Used      Comment: works at [employer], quit vapor cigs 10/2017     Alcohol use No     Family History   Problem Relation Age of Onset     Hypertension Mother      Hypertension Father      CANCER Maternal Grandmother      breast cancer     C.A.D. Maternal Grandmother      CANCER Paternal Grandmother      thyroid cancer           Reviewed and updated as needed this visit by clinical staff  Tobacco  Allergies  Meds  Med Hx  Surg Hx  Fam Hx  Soc Hx      Reviewed and updated as needed this visit by Provider         ROS:      OBJECTIVE:     /81 (BP Location: Right arm, Patient Position: Chair, Cuff Size: Adult Large)  Pulse 93  Temp 98.7  F (37.1  C) (Oral)  Resp 20  Ht 5' 9\" (1.753 m)  Wt " 179 lb (81.2 kg)  SpO2 100%  BMI 26.43 kg/m2  Body mass index is 26.43 kg/(m^2).  Skin: small pinpoint vesicles spread all over the arms, legs and abdomen, usually around the hair follicule.        ASSESSMENT/PLAN:             1. Keratosis pilaris  Start on   - ammonium lactate (LAC-HYDRIN) 12 % cream; Apply topically 2 times daily as needed for dry skin  Dispense: 385 g; Refill: 3    2. Symptoms concerning nutrition, metabolism, and development  Pt would like a referral for nutrition to talk about his diet and what is considered healthy and what is not.  - NUTRITION REFERRAL        Brenden Piña MD  [facility]

## 2018-02-01 NOTE — MR AVS SNAPSHOT
After Visit Summary   2/1/2018    Jesus Crow    MRN: [id number]           Patient Information     Date Of Birth          1995        Visit Information        Provider Department      2/1/2018 3:00 PM Brenden Piña MD [facility]        Today's Diagnoses     Keratosis pilaris    -  1    Symptoms concerning nutrition, metabolism, and development           Follow-ups after your visit        Additional Services     NUTRITION REFERRAL       Your provider has referred you to: [facility]: [facility] [place] [phone]   [email or web address]    Please be aware that coverage of these services is subject to the terms and limitations of your health insurance plan.  Call member services at your health plan with any benefit or coverage questions.      Please bring the following with you to your appointment:    (1) This referral request  (2) Any documents given to you regarding this referral  (3) Any specific questions you have about diet and/or food choices                  Who to contact     If you have questions or need follow up information about today's clinic visit or your schedule please contact [facility] directly at [phone].  Normal or non-critical lab and imaging results will be communicated to you by MyChart, letter or phone within 4 business days after the clinic has received the results. If you do not hear from us within 7 days, please contact the clinic through iHealthHomehart or phone. If you have a critical or abnormal lab result, we will notify you by phone as soon as possible.  Submit refill requests through MyNextRun or call your pharmacy and they will forward the refill request to us. Please allow 3 business days for your refill to be completed.          Additional Information About Your Visit        MyChart Information     MyNextRun gives you secure access to your electronic health record. If you see a primary care  "provider, you can also send messages to your care team and make appointments. If you have questions, please call your primary care clinic.  If you do not have a primary care provider, please call [phone] and they will assist you.        Care EveryWhere ID     This is your Care EveryWhere ID. This could be used by other organizations to access your [place] medical records  [id number]        Your Vitals Were     Pulse Temperature Respirations Height Pulse Oximetry BMI (Body Mass Index)    93 98.7  F (37.1  C) (Oral) 20 5' 9\" (1.753 m) 100% 26.43 kg/m2       Blood Pressure from Last 3 Encounters:   02/01/18 119/81   11/02/17 128/80   07/19/17 129/84    Weight from Last 3 Encounters:   02/01/18 179 lb (81.2 kg)   11/02/17 181 lb (82.1 kg)   07/19/17 204 lb (92.5 kg)              We Performed the Following     NUTRITION REFERRAL          Today's Medication Changes          These changes are accurate as of 2/1/18  3:35 PM.  If you have any questions, ask your nurse or doctor.               Start taking these medicines.        Dose/Directions    ammonium lactate 12 % cream   Commonly known as:  LAC-HYDRIN   Used for:  Keratosis pilaris   Started by:  Brenden Piña MD        Apply topically 2 times daily as needed for dry skin   Quantity:  385 g   Refills:  3            Where to get your medicines      These medications were sent to [facility] [address] [place] [address] [address] AT [facility] [address]  [address] [facility] [address]     Phone:  [phone]     ammonium lactate 12 % cream                Primary Care Provider Office Phone # Fax #    Brenden Piña [phone] [address] [facility] [address]        Equal Access to Services     GAVIN MERINO AH: Greg Bai, wanealda luqadaha, qaybta kaalmada makenna, kirit avery. So [facility] [place] [facility] [phone].    ATENCIÓN: Si habla español, tiene a pro disposición servicios " james de asistencia lingüística. Halle canada [phone].    We comply with applicable federal civil rights laws and [place] laws. We do not discriminate on the basis of race, color, national origin, age, disability, sex, sexual orientation, or gender identity.            Thank you!     Thank you for choosing [facility]  for your care. Our goal is always to provide you with excellent care. Hearing back from our patients is one way we can continue to improve our services. Please take a few minutes to complete the written survey that you may receive in the mail after your visit with us. Thank you!             Your Updated Medication List - Protect others around you: Learn how to safely use, store and throw away your medicines at www.disposemymeds.org.          This list is accurate as of 2/1/18  3:35 PM.  Always use your most recent med list.                   Brand Name Dispense Instructions for use Diagnosis    ammonium lactate 12 % cream    LAC-HYDRIN    385 g    Apply topically 2 times daily as needed for dry skin    Keratosis pilaris       * amphetamine-dextroamphetamine 30 MG per 24 hr capsule    ADDERALL XR    30 capsule    Take 1 capsule (30 mg) by mouth daily    Attention deficit hyperactivity disorder (ADHD), predominantly inattentive type       * amphetamine-dextroamphetamine 10 MG per tablet    ADDERALL    30 tablet    Take 1 tablet (10 mg) by mouth daily (before lunch)    Attention deficit hyperactivity disorder (ADHD), predominantly inattentive type       * Notice:  This list has 2 medication(s) that are the same as other medications prescribed for you. Read the directions carefully, and ask your doctor or other care provider to review them with you.

## 2018-02-21 ENCOUNTER — MYC REFILL (OUTPATIENT)
Dept: FAMILY MEDICINE | Facility: CLINIC | Age: 23
End: 2018-02-21

## 2018-02-21 DIAGNOSIS — F90.0 ATTENTION DEFICIT HYPERACTIVITY DISORDER (ADHD), PREDOMINANTLY INATTENTIVE TYPE: ICD-10-CM

## 2018-02-21 RX ORDER — DEXTROAMPHETAMINE SACCHARATE, AMPHETAMINE ASPARTATE MONOHYDRATE, DEXTROAMPHETAMINE SULFATE AND AMPHETAMINE SULFATE 7.5; 7.5; 7.5; 7.5 MG/1; MG/1; MG/1; MG/1
30 CAPSULE, EXTENDED RELEASE ORAL DAILY
Qty: 30 CAPSULE | Refills: 0 | Status: SHIPPED | OUTPATIENT
Start: 2018-02-21 | End: 2018-03-22

## 2018-02-21 RX ORDER — DEXTROAMPHETAMINE SACCHARATE, AMPHETAMINE ASPARTATE, DEXTROAMPHETAMINE SULFATE AND AMPHETAMINE SULFATE 2.5; 2.5; 2.5; 2.5 MG/1; MG/1; MG/1; MG/1
10 TABLET ORAL
Qty: 30 TABLET | Refills: 0 | Status: SHIPPED | OUTPATIENT
Start: 2018-02-21 | End: 2018-03-22

## 2018-02-21 NOTE — TELEPHONE ENCOUNTER
Message from MyChart:  Original authorizing provider: MD Max Tolliver would like a refill of the following medications:  amphetamine-dextroamphetamine (ADDERALL XR) 30 MG per 24 hr capsule [Brenden Piña MD]  amphetamine-dextroamphetamine (ADDERALL) 10 MG per tablet [Brenden Piña MD]    Preferred pharmacy: [facility] [place] [address] AT [facility] [address]    Comment:

## 2018-02-21 NOTE — TELEPHONE ENCOUNTER
Last OV: 21.1.18  Last RX; 1.16.18 #30 R 0/#30 R 0  : 12.20.17    amphetamine-dextroamphetamine (ADDERALL XR) 30 MG per 24 hr capsule [Brenden Piña MD]  amphetamine-dextroamphetamine (ADDERALL) 10 MG per tablet [Brenden Piña MD]    Routing refill request to provider for review/approval because:  Drug not on the [facility] refill protocol     Coral Grewal RN, BS  Clinical Nurse Triage.

## 2018-03-20 ENCOUNTER — MYC REFILL (OUTPATIENT)
Dept: FAMILY MEDICINE | Facility: CLINIC | Age: 23
End: 2018-03-20

## 2018-03-20 DIAGNOSIS — F90.0 ATTENTION DEFICIT HYPERACTIVITY DISORDER (ADHD), PREDOMINANTLY INATTENTIVE TYPE: ICD-10-CM

## 2018-03-20 RX ORDER — DEXTROAMPHETAMINE SACCHARATE, AMPHETAMINE ASPARTATE, DEXTROAMPHETAMINE SULFATE AND AMPHETAMINE SULFATE 2.5; 2.5; 2.5; 2.5 MG/1; MG/1; MG/1; MG/1
10 TABLET ORAL
Qty: 30 TABLET | Refills: 0 | Status: CANCELLED | OUTPATIENT
Start: 2018-03-20

## 2018-03-20 RX ORDER — DEXTROAMPHETAMINE SACCHARATE, AMPHETAMINE ASPARTATE MONOHYDRATE, DEXTROAMPHETAMINE SULFATE AND AMPHETAMINE SULFATE 7.5; 7.5; 7.5; 7.5 MG/1; MG/1; MG/1; MG/1
30 CAPSULE, EXTENDED RELEASE ORAL DAILY
Qty: 30 CAPSULE | Refills: 0 | Status: CANCELLED | OUTPATIENT
Start: 2018-03-20

## 2018-03-20 NOTE — TELEPHONE ENCOUNTER
Controlled Substance Refill Request  Problem List Complete:  Yes  Medication(s): Adderall XR 30 mg and Adderall 10 mg.   Maximum quantity per month: 30 and 30  Clinic visit frequency required: Q 6  months    Last OV with Dr. Piña 2/1/18    Controlled substance agreement on file: No  Neuropsych evaluation for ADD completed:  Unsure-seeing mental health    Last [facility] website verification:  done on 12/20/17  Forrest Ingram RN

## 2018-03-20 NOTE — TELEPHONE ENCOUNTER
Message from MyChart:  Original authorizing provider: MD Max Tolliver would like a refill of the following medications:  amphetamine-dextroamphetamine (ADDERALL XR) 30 MG per 24 hr capsule [Brenden Piña MD]  amphetamine-dextroamphetamine (ADDERALL) 10 MG per tablet [Brenden Piña MD]    Preferred pharmacy: [facility] [place] [address] AT [facility] [address]    Comment:

## 2018-03-20 NOTE — TELEPHONE ENCOUNTER
odalis call, pt needs to be seen.   Prescription should be given every 3 months, also need to sign a contract with me too.  Brenden Piña MD  [facility]  [phone]

## 2018-03-21 PROBLEM — F90.0 ATTENTION DEFICIT HYPERACTIVITY DISORDER (ADHD), PREDOMINANTLY INATTENTIVE TYPE: Status: ACTIVE | Noted: 2017-05-31

## 2018-03-21 NOTE — TELEPHONE ENCOUNTER
Pt informed. Agrees to plan. OV tomorrow afternoon.    Problem updated to visits Q3M  Forrest Ingram RN

## 2018-03-22 ENCOUNTER — MYC MEDICAL ADVICE (OUTPATIENT)
Dept: FAMILY MEDICINE | Facility: CLINIC | Age: 23
End: 2018-03-22

## 2018-03-22 ENCOUNTER — OFFICE VISIT (OUTPATIENT)
Dept: FAMILY MEDICINE | Facility: CLINIC | Age: 23
End: 2018-03-22
Payer: COMMERCIAL

## 2018-03-22 VITALS
SYSTOLIC BLOOD PRESSURE: 128 MMHG | DIASTOLIC BLOOD PRESSURE: 76 MMHG | BODY MASS INDEX: 26.36 KG/M2 | WEIGHT: 178 LBS | HEIGHT: 69 IN | RESPIRATION RATE: 16 BRPM | HEART RATE: 74 BPM | TEMPERATURE: 99 F | OXYGEN SATURATION: 98 %

## 2018-03-22 DIAGNOSIS — F90.0 ATTENTION DEFICIT HYPERACTIVITY DISORDER (ADHD), PREDOMINANTLY INATTENTIVE TYPE: ICD-10-CM

## 2018-03-22 PROCEDURE — 99214 OFFICE O/P EST MOD 30 MIN: CPT | Performed by: FAMILY MEDICINE

## 2018-03-22 RX ORDER — DEXTROAMPHETAMINE SACCHARATE, AMPHETAMINE ASPARTATE, DEXTROAMPHETAMINE SULFATE AND AMPHETAMINE SULFATE 2.5; 2.5; 2.5; 2.5 MG/1; MG/1; MG/1; MG/1
10 TABLET ORAL
Qty: 30 TABLET | Refills: 0 | Status: SHIPPED | OUTPATIENT
Start: 2018-04-22 | End: 2018-03-22

## 2018-03-22 RX ORDER — DEXTROAMPHETAMINE SACCHARATE, AMPHETAMINE ASPARTATE MONOHYDRATE, DEXTROAMPHETAMINE SULFATE AND AMPHETAMINE SULFATE 7.5; 7.5; 7.5; 7.5 MG/1; MG/1; MG/1; MG/1
30 CAPSULE, EXTENDED RELEASE ORAL DAILY
Qty: 30 CAPSULE | Refills: 0 | Status: SHIPPED | OUTPATIENT
Start: 2018-05-22 | End: 2018-06-22

## 2018-03-22 RX ORDER — DEXTROAMPHETAMINE SACCHARATE, AMPHETAMINE ASPARTATE, DEXTROAMPHETAMINE SULFATE AND AMPHETAMINE SULFATE 2.5; 2.5; 2.5; 2.5 MG/1; MG/1; MG/1; MG/1
10 TABLET ORAL
Qty: 30 TABLET | Refills: 0 | Status: SHIPPED | OUTPATIENT
Start: 2018-03-22 | End: 2018-03-22

## 2018-03-22 RX ORDER — DEXTROAMPHETAMINE SACCHARATE, AMPHETAMINE ASPARTATE MONOHYDRATE, DEXTROAMPHETAMINE SULFATE AND AMPHETAMINE SULFATE 7.5; 7.5; 7.5; 7.5 MG/1; MG/1; MG/1; MG/1
30 CAPSULE, EXTENDED RELEASE ORAL DAILY
Qty: 30 CAPSULE | Refills: 0 | Status: SHIPPED | OUTPATIENT
Start: 2018-03-22 | End: 2018-03-22

## 2018-03-22 RX ORDER — DEXTROAMPHETAMINE SACCHARATE, AMPHETAMINE ASPARTATE, DEXTROAMPHETAMINE SULFATE AND AMPHETAMINE SULFATE 2.5; 2.5; 2.5; 2.5 MG/1; MG/1; MG/1; MG/1
10 TABLET ORAL
Qty: 30 TABLET | Refills: 0 | Status: SHIPPED | OUTPATIENT
Start: 2018-05-22 | End: 2018-06-22

## 2018-03-22 RX ORDER — DEXTROAMPHETAMINE SACCHARATE, AMPHETAMINE ASPARTATE MONOHYDRATE, DEXTROAMPHETAMINE SULFATE AND AMPHETAMINE SULFATE 7.5; 7.5; 7.5; 7.5 MG/1; MG/1; MG/1; MG/1
30 CAPSULE, EXTENDED RELEASE ORAL DAILY
Qty: 30 CAPSULE | Refills: 0 | Status: SHIPPED | OUTPATIENT
Start: 2018-04-22 | End: 2018-03-22

## 2018-03-22 NOTE — MR AVS SNAPSHOT
"              After Visit Summary   3/22/2018    Jesus Crow    MRN: [id number]           Patient Information     Date Of Birth          1995        Visit Information        Provider Department      3/22/2018 1:30 PM Brenden Piña MD [facility]        Today's Diagnoses     Attention deficit hyperactivity disorder (ADHD), predominantly inattentive type           Follow-ups after your visit        Who to contact     If you have questions or need follow up information about today's clinic visit or your schedule please contact [facility] directly at [phone].  Normal or non-critical lab and imaging results will be communicated to you by Makelight Interactivehart, letter or phone within 4 business days after the clinic has received the results. If you do not hear from us within 7 days, please contact the clinic through Veroseet or phone. If you have a critical or abnormal lab result, we will notify you by phone as soon as possible.  Submit refill requests through Helixis or call your pharmacy and they will forward the refill request to us. Please allow 3 business days for your refill to be completed.          Additional Information About Your Visit        MyChart Information     Helixis gives you secure access to your electronic health record. If you see a primary care provider, you can also send messages to your care team and make appointments. If you have questions, please call your primary care clinic.  If you do not have a primary care provider, please call [phone] and they will assist you.        Care EveryWhere ID     This is your Care EveryWhere ID. This could be used by other organizations to access your [place] medical records  [id number]        Your Vitals Were     Pulse Temperature Respirations Height Pulse Oximetry BMI (Body Mass Index)    74 99  F (37.2  C) (Oral) 16 5' 9\" (1.753 m) 98% 26.29 kg/m2       Blood Pressure from Last 3 Encounters:   03/22/18 128/76 "   02/01/18 119/81   11/02/17 128/80    Weight from Last 3 Encounters:   03/22/18 178 lb (80.7 kg)   02/01/18 179 lb (81.2 kg)   11/02/17 181 lb (82.1 kg)              Today, you had the following     No orders found for display         Today's Medication Changes          These changes are accurate as of 3/22/18  2:07 PM.  If you have any questions, ask your nurse or doctor.               Start taking these medicines.        Dose/Directions    * amphetamine-dextroamphetamine 30 MG per 24 hr capsule   Commonly known as:  ADDERALL XR   Used for:  Attention deficit hyperactivity disorder (ADHD), predominantly inattentive type   Started by:  Brenden Piña MD        Dose:  30 mg   Start taking on:  5/22/2018   Take 1 capsule (30 mg) by mouth daily   Quantity:  30 capsule   Refills:  0       * amphetamine-dextroamphetamine 10 MG per tablet   Commonly known as:  ADDERALL   Used for:  Attention deficit hyperactivity disorder (ADHD), predominantly inattentive type   Started by:  Brenden Piña MD        Dose:  10 mg   Start taking on:  5/22/2018   Take 1 tablet (10 mg) by mouth daily (before lunch)   Quantity:  30 tablet   Refills:  0       * Notice:  This list has 2 medication(s) that are the same as other medications prescribed for you. Read the directions carefully, and ask your doctor or other care provider to review them with you.         Where to get your medicines      Some of these will need a paper prescription and others can be bought over the counter.  Ask your nurse if you have questions.     Bring a paper prescription for each of these medications     amphetamine-dextroamphetamine 10 MG per tablet    amphetamine-dextroamphetamine 30 MG per 24 hr capsule                Primary Care Provider Office Phone # Fax #    Brenden Piña [phone] [address] [facility] [address]        Equal Access to Services     JOSE MERINO AH: tita Mcelroy qaybta kaalmada  kirit mensahgrazyna palmer'aan ah. [facility] [phone].    ATENCIÓN: Si moy gonzales, tiene a pro disposición servicios gratuitos de asistencia lingüística. Halle al [phone].    We comply with applicable federal civil rights laws and [place] laws. We do not discriminate on the basis of race, color, national origin, age, disability, sex, sexual orientation, or gender identity.            Thank you!     Thank you for choosing [facility]  for your care. Our goal is always to provide you with excellent care. Hearing back from our patients is one way we can continue to improve our services. Please take a few minutes to complete the written survey that you may receive in the mail after your visit with us. Thank you!             Your Updated Medication List - Protect others around you: Learn how to safely use, store and throw away your medicines at www.disposemymeds.org.          This list is accurate as of 3/22/18  2:07 PM.  Always use your most recent med list.                   Brand Name Dispense Instructions for use Diagnosis    ammonium lactate 12 % cream    LAC-HYDRIN    385 g    Apply topically 2 times daily as needed for dry skin    Keratosis pilaris       * amphetamine-dextroamphetamine 30 MG per 24 hr capsule   Start taking on:  5/22/2018    ADDERALL XR    30 capsule    Take 1 capsule (30 mg) by mouth daily    Attention deficit hyperactivity disorder (ADHD), predominantly inattentive type       * amphetamine-dextroamphetamine 10 MG per tablet   Start taking on:  5/22/2018    ADDERALL    30 tablet    Take 1 tablet (10 mg) by mouth daily (before lunch)    Attention deficit hyperactivity disorder (ADHD), predominantly inattentive type       * Notice:  This list has 2 medication(s) that are the same as other medications prescribed for you. Read the directions carefully, and ask your doctor or other care provider to review them with you.

## 2018-03-22 NOTE — PROGRESS NOTES
SUBJECTIVE:   Jesus Crow is a 22 year old male who presents to clinic today for the following health issues:      HPI  ADHD Follow-Up    Date of last ADHD office visit:   Status since last visit: Stable  Taking controlled (daily) medications as prescribed: Yes                             Co-Morbid Diagnosis: None    Currently in counseling: no.        Medication Benefits:   Controlled symptoms: Attention span, Distractability, Finishing tasks, Impulse control and Frustration tolerance  Uncontrolled symptoms: None    Medication side effects:  Side effects noted: none  Denies: appetite suppression, weight loss, insomnia, tics, palpitations, stomach ache, headache, drowsiness, growth suppression and dry mouth    Pt ADHD has improved and is feeling that the attention span, distractibility, and forgetfulness are under better control ,no adverse effect of the medication, pt has been taking adhd medicine regularly, and is consistent  with his refills request          Problem list and histories reviewed & adjusted, as indicated.  Additional history: as documented    Patient Active Problem List   Diagnosis     Allergic rhinitis     Chronic daily headache     Keratosis pilaris     Insomnia     Attention deficit hyperactivity disorder (ADHD), predominantly inattentive type     Past Surgical History:   Procedure Laterality Date     NO HISTORY OF SURGERY         Social History   Substance Use Topics     Smoking status: Passive Smoke Exposure - Never Smoker     Smokeless tobacco: Never Used      Comment: works at [employer], quit vapor cigs 10/2017     Alcohol use No     Family History   Problem Relation Age of Onset     Hypertension Mother      Hypertension Father      CANCER Maternal Grandmother      breast cancer     C.A.D. Maternal Grandmother      CANCER Paternal Grandmother      thyroid cancer         Current Outpatient Prescriptions   Medication Sig Dispense Refill     [START ON 5/22/2018]  "amphetamine-dextroamphetamine (ADDERALL XR) 30 MG per 24 hr capsule Take 1 capsule (30 mg) by mouth daily 30 capsule 0     [START ON 5/22/2018] amphetamine-dextroamphetamine (ADDERALL) 10 MG per tablet Take 1 tablet (10 mg) by mouth daily (before lunch) 30 tablet 0     ammonium lactate (LAC-HYDRIN) 12 % cream Apply topically 2 times daily as needed for dry skin 385 g 3       Reviewed and updated as needed this visit by clinical staff  Allergies  Meds       Reviewed and updated as needed this visit by Provider         ROS:  CONSTITUTIONAL: weight loss, intentional.  RESP: NEGATIVE for significant cough or SOB  CV: NEGATIVE for chest pain, palpitations or peripheral edema    OBJECTIVE:     /76 (BP Location: Right arm, Patient Position: Chair, Cuff Size: Adult Large)  Pulse 74  Temp 99  F (37.2  C) (Oral)  Resp 16  Ht 5' 9\" (1.753 m)  Wt 178 lb (80.7 kg)  SpO2 98%  BMI 26.29 kg/m2  Body mass index is 26.29 kg/(m^2).  GENERAL: healthy, alert and no distress  RESP: lungs clear to auscultation - no rales, rhonchi or wheezes  CV: regular rate and rhythm, normal S1 S2, no S3 or S4, no murmur, click or rub, no peripheral edema and peripheral pulses strong  MS: no gross musculoskeletal defects noted, no edema        ASSESSMENT/PLAN:             1. Attention deficit hyperactivity disorder (ADHD), predominantly inattentive type  Doing very well, contract signed today for controlled substance agreement, and will give 3 months supply  - amphetamine-dextroamphetamine (ADDERALL XR) 30 MG per 24 hr capsule; Take 1 capsule (30 mg) by mouth daily  Dispense: 30 capsule; Refill: 0  - amphetamine-dextroamphetamine (ADDERALL) 10 MG per tablet; Take 1 tablet (10 mg) by mouth daily (before lunch)  Dispense: 30 tablet; Refill: 0    FUTURE APPOINTMENTS:       - Follow-up visit in 3 months.    Brenden Piña MD  [facility]"

## 2018-03-22 NOTE — NURSING NOTE
"Chief Complaint   Patient presents with     Recheck Medication       Initial /76 (BP Location: Right arm, Patient Position: Chair, Cuff Size: Adult Large)  Pulse 74  Temp 99  F (37.2  C) (Oral)  Resp 16  Ht 5' 9\" (1.753 m)  Wt 178 lb (80.7 kg)  SpO2 98%  BMI 26.29 kg/m2 Estimated body mass index is 26.29 kg/(m^2) as calculated from the following:    Height as of this encounter: 5' 9\" (1.753 m).    Weight as of this encounter: 178 lb (80.7 kg).  Medication Reconciliation: complete   "

## 2018-03-22 NOTE — LETTER
[facility]    03/22/18    Patient: Jesus Crow  YOB: 1995  Medical Record Number: [id number]                                                                  Controlled Substance Agreement  I understand that my care provider has prescribed controlled substances (narcotics, tranquilizers, and/or stimulants) to help manage my condition(s).  I am taking this medicine to help me function or work.  I know that this is strong medicine.  It could have serious side effects and even cause a dependency on the drug.  If I stop these medicines suddenly, I could have severe withdrawal symptoms.    The risks, benefits, and side effects of these medication(s) were explained to me.  I agree that:  1. I will take part in other treatments as advised by my provider.  This may be psychiatry or counseling, physical therapy, behavioral therapy, group treatment, or a referral to a pain clinic.  I will reduce or stop my medicine when my provider tells me to do so.   2. I will take my medicines as prescribed.  I will not change the dose or schedule unless my provider tells me to.  There will be no refills if I  run out early.   I may be contacted at any time without warning and asked to complete a drug test or pill count.   3. I will keep all my appointments at the clinic.  If I miss appointments or fail to follow instructions, my provider may stop my medicine.  4. I will not ask other providers to prescribe controlled substances. And I will not accept controlled substances from other people. If I need another prescribed controlled substance for a new reason, I will notify my provider within one business day.  5. If I enroll in the [place] Medical Marijuana program, I will tell my provider.  I will also sign an agreement to share my medical records with my provider.  6. I will use one pharmacy to fill all of my controlled substance prescriptions.  If my prescription is mailed to my pharmacy, it may  take 5 to 7 days for my medicine to be ready.  7. I understand that my provider, clinic care team, and pharmacy can track controlled substance prescriptions from other providers through a central database (prescription monitoring program).  8. I will bring in my list of medications (or my medicine bottles) each time I come to the clinic.  REV-  04/2016                                                                                                                                            Page 1 of 2      [facility]    03/22/18    Patient: Jesus Crow  YOB: 1995  Medical Record Number: [id number]    9. Refills of controlled substances will be made only during office hours.  It is up to me to make sure that I do not run out of my medicines on weekends or holidays.    10. I am responsible for my prescriptions.  If the medicine is lost or stolen, it will not be replaced.   I also agree not to share these medicines with anyone.  11. I agree to not use ANY illegal or recreational drugs.  This includes marijuana, cocaine, bath salts or other drugs.  I agree not to use alcohol unless my provider says I may.  I agree to give urine samples whenever asked.  If I fail to give a urine sample, the provider may stop my medicine.     12. I will tell my nurse or provider right away if I become pregnant or have a new medical problem treated outside of [facility].  13. I understand that this medicine can affect my thinking and judgment.  It may be unsafe for me to drive, use machinery and do dangerous tasks.  I will not do any of these things until I know how the medicine affects me.  If my dose changes, I will wait to see how it affects me.  I will contact my provider if I have concerns about medicine side effects.  I understand that if I do not follow any of the conditions above, my prescriptions or treatment may be stopped.    I agree that my provider, clinic care team, and pharmacy may  work with any city, state or federal law enforcement agency that investigates the misuse, sale, or other diversion of my controlled medicine. I will allow my provider to discuss my care with or share a copy of this agreement with any other treating provider, pharmacy or emergency room where I receive care.  I agree to give up (waive) any right of privacy or confidentiality with respect to these authorizations.   I have read this agreement and have asked questions about anything I did not understand.   ___________________________________    ___________________________  Patient Signature                                                           Date and Time  ___________________________________     ____________________________  Witness                                                                            Date and Time  ___________________________________  Brenden Piña MD  REV-  04/2016                                                                                                                                                                 Page 2 of 2

## 2018-06-22 ENCOUNTER — OFFICE VISIT (OUTPATIENT)
Dept: FAMILY MEDICINE | Facility: CLINIC | Age: 23
End: 2018-06-22
Payer: COMMERCIAL

## 2018-06-22 VITALS
HEIGHT: 69 IN | DIASTOLIC BLOOD PRESSURE: 72 MMHG | TEMPERATURE: 98.1 F | HEART RATE: 107 BPM | BODY MASS INDEX: 28.73 KG/M2 | SYSTOLIC BLOOD PRESSURE: 137 MMHG | OXYGEN SATURATION: 100 % | WEIGHT: 194 LBS | RESPIRATION RATE: 20 BRPM

## 2018-06-22 DIAGNOSIS — F90.0 ATTENTION DEFICIT HYPERACTIVITY DISORDER (ADHD), PREDOMINANTLY INATTENTIVE TYPE: ICD-10-CM

## 2018-06-22 PROCEDURE — 99000 SPECIMEN HANDLING OFFICE-LAB: CPT | Performed by: FAMILY MEDICINE

## 2018-06-22 PROCEDURE — 80307 DRUG TEST PRSMV CHEM ANLYZR: CPT | Mod: 90 | Performed by: FAMILY MEDICINE

## 2018-06-22 PROCEDURE — 99213 OFFICE O/P EST LOW 20 MIN: CPT | Performed by: FAMILY MEDICINE

## 2018-06-22 RX ORDER — DEXTROAMPHETAMINE SACCHARATE, AMPHETAMINE ASPARTATE MONOHYDRATE, DEXTROAMPHETAMINE SULFATE AND AMPHETAMINE SULFATE 7.5; 7.5; 7.5; 7.5 MG/1; MG/1; MG/1; MG/1
30 CAPSULE, EXTENDED RELEASE ORAL DAILY
Qty: 30 CAPSULE | Refills: 0 | Status: SHIPPED | OUTPATIENT
Start: 2018-08-22 | End: 2018-09-22

## 2018-06-22 RX ORDER — DEXTROAMPHETAMINE SACCHARATE, AMPHETAMINE ASPARTATE, DEXTROAMPHETAMINE SULFATE AND AMPHETAMINE SULFATE 2.5; 2.5; 2.5; 2.5 MG/1; MG/1; MG/1; MG/1
10 TABLET ORAL
Qty: 30 TABLET | Refills: 0 | Status: SHIPPED | OUTPATIENT
Start: 2018-08-22 | End: 2018-09-22

## 2018-06-22 RX ORDER — DEXTROAMPHETAMINE SACCHARATE, AMPHETAMINE ASPARTATE, DEXTROAMPHETAMINE SULFATE AND AMPHETAMINE SULFATE 2.5; 2.5; 2.5; 2.5 MG/1; MG/1; MG/1; MG/1
10 TABLET ORAL
Qty: 30 TABLET | Refills: 0 | Status: SHIPPED | OUTPATIENT
Start: 2018-06-22 | End: 2018-06-22

## 2018-06-22 RX ORDER — DEXTROAMPHETAMINE SACCHARATE, AMPHETAMINE ASPARTATE MONOHYDRATE, DEXTROAMPHETAMINE SULFATE AND AMPHETAMINE SULFATE 7.5; 7.5; 7.5; 7.5 MG/1; MG/1; MG/1; MG/1
30 CAPSULE, EXTENDED RELEASE ORAL DAILY
Qty: 30 CAPSULE | Refills: 0 | Status: SHIPPED | OUTPATIENT
Start: 2018-07-22 | End: 2018-06-22

## 2018-06-22 RX ORDER — DEXTROAMPHETAMINE SACCHARATE, AMPHETAMINE ASPARTATE, DEXTROAMPHETAMINE SULFATE AND AMPHETAMINE SULFATE 2.5; 2.5; 2.5; 2.5 MG/1; MG/1; MG/1; MG/1
10 TABLET ORAL
Qty: 30 TABLET | Refills: 0 | Status: SHIPPED | OUTPATIENT
Start: 2018-07-22 | End: 2018-06-22

## 2018-06-22 RX ORDER — DEXTROAMPHETAMINE SACCHARATE, AMPHETAMINE ASPARTATE MONOHYDRATE, DEXTROAMPHETAMINE SULFATE AND AMPHETAMINE SULFATE 7.5; 7.5; 7.5; 7.5 MG/1; MG/1; MG/1; MG/1
30 CAPSULE, EXTENDED RELEASE ORAL DAILY
Qty: 30 CAPSULE | Refills: 0 | Status: SHIPPED | OUTPATIENT
Start: 2018-06-22 | End: 2018-06-22

## 2018-06-22 NOTE — PROGRESS NOTES
"  SUBJECTIVE:   Jesus Crow is a 22 year old male who presents to clinic today for the following health issues:      Medication Followup of Adderall    Taking Medication as prescribed: yes    Side Effects:  None    Medication Helping Symptoms:  yes       Pt ADHD has improved and is feeling that the attention span, distractibility, and forgetfulness are under better control ,no adverse effect of the medication, pt has been taking pain medicine regularly, and is consistent  with his refills request        Problem list and histories reviewed & adjusted, as indicated.  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 Maternal Grandmother      Cancer Paternal Grandmother      thyroid cancer           Reviewed and updated as needed this visit by clinical staff  Tobacco  Allergies  Meds  Med Hx  Surg Hx  Fam Hx  Soc Hx      Reviewed and updated as needed this visit by Provider         ROS:      OBJECTIVE:     /72 (BP Location: Right arm, Patient Position: Chair, Cuff Size: Adult Large)  Pulse 107  Temp 98.1  F (36.7  C) (Oral)  Resp 20  Ht 5' 9\" (1.753 m)  Wt 194 lb (88 kg)  SpO2 100%  BMI 28.65 kg/m2  Body mass index is 28.65 kg/(m^2).  GENERAL: healthy, alert and no distress  RESP: " lungs clear to auscultation - no rales, rhonchi or wheezes  CV: regular rate and rhythm, normal S1 S2, no S3 or S4, no murmur, click or rub, no peripheral edema and peripheral pulses strong        ASSESSMENT/PLAN:             1. Attention deficit hyperactivity disorder (ADHD), predominantly inattentive type, stable  Will give 3 months supply.   - amphetamine-dextroamphetamine (ADDERALL XR) 30 MG per 24 hr capsule; Take 1 capsule (30 mg) by mouth daily  Dispense: 30 capsule; Refill: 0  - amphetamine-dextroamphetamine (ADDERALL) 10 MG per tablet; Take 1 tablet (10 mg) by mouth daily (before lunch)  Dispense: 30 tablet; Refill: 0  - Drug  Screen Comprehensive , Urine with Reported Meds (MedTox) (Pain Care Package)    Follow oup in 3 months,     Brenden Piña MD  [facility]

## 2018-06-22 NOTE — MR AVS SNAPSHOT
"              After Visit Summary   6/22/2018    Jesus Crow    MRN: [id number]           Patient Information     Date Of Birth          1995        Visit Information        Provider Department      6/22/2018 11:30 AM Brenden Piña MD [facility]        Today's Diagnoses     Attention deficit hyperactivity disorder (ADHD), predominantly inattentive type           Follow-ups after your visit        Follow-up notes from your care team     Return in about 3 months (around 9/22/2018).      Who to contact     If you have questions or need follow up information about today's clinic visit or your schedule please contact [facility] directly at [phone].  Normal or non-critical lab and imaging results will be communicated to you by MyChart, letter or phone within 4 business days after the clinic has received the results. If you do not hear from us within 7 days, please contact the clinic through ProZymehart or phone. If you have a critical or abnormal lab result, we will notify you by phone as soon as possible.  Submit refill requests through SampalRx or call your pharmacy and they will forward the refill request to us. Please allow 3 business days for your refill to be completed.          Additional Information About Your Visit        MyChart Information     SampalRx gives you secure access to your electronic health record. If you see a primary care provider, you can also send messages to your care team and make appointments. If you have questions, please call your primary care clinic.  If you do not have a primary care provider, please call [phone] and they will assist you.        Care EveryWhere ID     This is your Care EveryWhere ID. This could be used by other organizations to access your [place] medical records  [id number]        Your Vitals Were     Pulse Temperature Respirations Height Pulse Oximetry BMI (Body Mass Index)    107 98.1  F (36.7  C) (Oral) 20 5' 9\" " (1.753 m) 100% 28.65 kg/m2       Blood Pressure from Last 3 Encounters:   06/22/18 137/72   03/22/18 128/76   02/01/18 119/81    Weight from Last 3 Encounters:   06/22/18 194 lb (88 kg)   03/22/18 178 lb (80.7 kg)   02/01/18 179 lb (81.2 kg)              We Performed the Following     Drug  Screen Comprehensive , Urine with Reported Meds (MedTox) (Pain Care Package)          Today's Medication Changes          These changes are accurate as of 6/22/18 12:10 PM.  If you have any questions, ask your nurse or doctor.               Start taking these medicines.        Dose/Directions    * amphetamine-dextroamphetamine 30 MG per 24 hr capsule   Commonly known as:  ADDERALL XR   Used for:  Attention deficit hyperactivity disorder (ADHD), predominantly inattentive type   Started by:  Brenden Piña MD        Dose:  30 mg   Start taking on:  8/22/2018   Take 1 capsule (30 mg) by mouth daily   Quantity:  30 capsule   Refills:  0       * amphetamine-dextroamphetamine 10 MG per tablet   Commonly known as:  ADDERALL   Used for:  Attention deficit hyperactivity disorder (ADHD), predominantly inattentive type   Started by:  Brenden Piña MD        Dose:  10 mg   Start taking on:  8/22/2018   Take 1 tablet (10 mg) by mouth daily (before lunch)   Quantity:  30 tablet   Refills:  0       * Notice:  This list has 2 medication(s) that are the same as other medications prescribed for you. Read the directions carefully, and ask your doctor or other care provider to review them with you.         Where to get your medicines      Some of these will need a paper prescription and others can be bought over the counter.  Ask your nurse if you have questions.     Bring a paper prescription for each of these medications     amphetamine-dextroamphetamine 10 MG per tablet    amphetamine-dextroamphetamine 30 MG per 24 hr capsule                Primary Care Provider Office Phone # Fax #    Brenden Piña [phone] [address]  [facility] [address]        Equal Access to Services     [facility] ANGELIQUE : Hadii primo landrum chandrakantmoe Sopiedadali, waaxda luqadaha, qaybta kaalmaconor mensah, kirit avery. So [facility] [phone].    ATENCIÓN: Si habla español, tiene a pro disposición servicios gratuitos de asistencia lingüística. Rebekahame al [phone].    We comply with applicable federal civil rights laws and [place] laws. We do not discriminate on the basis of race, color, national origin, age, disability, sex, sexual orientation, or gender identity.            Thank you!     Thank you for choosing [facility]  for your care. Our goal is always to provide you with excellent care. Hearing back from our patients is one way we can continue to improve our services. Please take a few minutes to complete the written survey that you may receive in the mail after your visit with us. Thank you!             Your Updated Medication List - Protect others around you: Learn how to safely use, store and throw away your medicines at www.disposemymeds.org.          This list is accurate as of 6/22/18 12:10 PM.  Always use your most recent med list.                   Brand Name Dispense Instructions for use Diagnosis    ammonium lactate 12 % cream    LAC-HYDRIN    385 g    Apply topically 2 times daily as needed for dry skin    Keratosis pilaris       * amphetamine-dextroamphetamine 30 MG per 24 hr capsule   Start taking on:  8/22/2018    ADDERALL XR    30 capsule    Take 1 capsule (30 mg) by mouth daily    Attention deficit hyperactivity disorder (ADHD), predominantly inattentive type       * amphetamine-dextroamphetamine 10 MG per tablet   Start taking on:  8/22/2018    ADDERALL    30 tablet    Take 1 tablet (10 mg) by mouth daily (before lunch)    Attention deficit hyperactivity disorder (ADHD), predominantly inattentive type       * Notice:  This list has 2 medication(s) that are the same as other medications prescribed  for you. Read the directions carefully, and ask your doctor or other care provider to review them with you.

## 2018-06-27 LAB — PAIN DRUG SCR UR W RPTD MEDS: NORMAL

## 2018-09-22 ENCOUNTER — OFFICE VISIT (OUTPATIENT)
Dept: FAMILY MEDICINE | Facility: CLINIC | Age: 23
End: 2018-09-22
Payer: COMMERCIAL

## 2018-09-22 VITALS
TEMPERATURE: 98.1 F | HEART RATE: 83 BPM | DIASTOLIC BLOOD PRESSURE: 80 MMHG | WEIGHT: 194 LBS | OXYGEN SATURATION: 98 % | BODY MASS INDEX: 28.73 KG/M2 | HEIGHT: 69 IN | SYSTOLIC BLOOD PRESSURE: 138 MMHG

## 2018-09-22 DIAGNOSIS — F90.0 ATTENTION DEFICIT HYPERACTIVITY DISORDER (ADHD), PREDOMINANTLY INATTENTIVE TYPE: ICD-10-CM

## 2018-09-22 DIAGNOSIS — Z23 NEED FOR PROPHYLACTIC VACCINATION WITH TETANUS-DIPHTHERIA (TD): ICD-10-CM

## 2018-09-22 DIAGNOSIS — Z23 NEED FOR PROPHYLACTIC VACCINATION AND INOCULATION AGAINST INFLUENZA: ICD-10-CM

## 2018-09-22 PROCEDURE — 90686 IIV4 VACC NO PRSV 0.5 ML IM: CPT | Performed by: FAMILY MEDICINE

## 2018-09-22 PROCEDURE — 99214 OFFICE O/P EST MOD 30 MIN: CPT | Mod: 25 | Performed by: FAMILY MEDICINE

## 2018-09-22 PROCEDURE — 90471 IMMUNIZATION ADMIN: CPT | Performed by: FAMILY MEDICINE

## 2018-09-22 RX ORDER — DEXTROAMPHETAMINE SACCHARATE, AMPHETAMINE ASPARTATE, DEXTROAMPHETAMINE SULFATE AND AMPHETAMINE SULFATE 2.5; 2.5; 2.5; 2.5 MG/1; MG/1; MG/1; MG/1
10 TABLET ORAL
Qty: 30 TABLET | Refills: 0 | Status: SHIPPED | OUTPATIENT
Start: 2018-10-22 | End: 2018-09-22

## 2018-09-22 RX ORDER — DEXTROAMPHETAMINE SACCHARATE, AMPHETAMINE ASPARTATE, DEXTROAMPHETAMINE SULFATE AND AMPHETAMINE SULFATE 2.5; 2.5; 2.5; 2.5 MG/1; MG/1; MG/1; MG/1
10 TABLET ORAL
Qty: 30 TABLET | Refills: 0 | Status: SHIPPED | OUTPATIENT
Start: 2018-11-22 | End: 2018-09-22

## 2018-09-22 RX ORDER — DEXTROAMPHETAMINE SACCHARATE, AMPHETAMINE ASPARTATE MONOHYDRATE, DEXTROAMPHETAMINE SULFATE AND AMPHETAMINE SULFATE 6.25; 6.25; 6.25; 6.25 MG/1; MG/1; MG/1; MG/1
25 CAPSULE, EXTENDED RELEASE ORAL EVERY MORNING
Qty: 30 CAPSULE | Refills: 0 | Status: SHIPPED | OUTPATIENT
Start: 2018-12-22 | End: 2018-11-19

## 2018-09-22 RX ORDER — DEXTROAMPHETAMINE SACCHARATE, AMPHETAMINE ASPARTATE, DEXTROAMPHETAMINE SULFATE AND AMPHETAMINE SULFATE 2.5; 2.5; 2.5; 2.5 MG/1; MG/1; MG/1; MG/1
10 TABLET ORAL
Qty: 30 TABLET | Refills: 0 | Status: SHIPPED | OUTPATIENT
Start: 2018-09-22 | End: 2018-09-22

## 2018-09-22 RX ORDER — DEXTROAMPHETAMINE SACCHARATE, AMPHETAMINE ASPARTATE MONOHYDRATE, DEXTROAMPHETAMINE SULFATE AND AMPHETAMINE SULFATE 6.25; 6.25; 6.25; 6.25 MG/1; MG/1; MG/1; MG/1
25 CAPSULE, EXTENDED RELEASE ORAL EVERY MORNING
Qty: 30 CAPSULE | Refills: 0 | Status: SHIPPED | OUTPATIENT
Start: 2018-09-22 | End: 2018-09-22

## 2018-09-22 RX ORDER — DEXTROAMPHETAMINE SACCHARATE, AMPHETAMINE ASPARTATE, DEXTROAMPHETAMINE SULFATE AND AMPHETAMINE SULFATE 2.5; 2.5; 2.5; 2.5 MG/1; MG/1; MG/1; MG/1
10 TABLET ORAL
Qty: 30 TABLET | Refills: 0 | Status: SHIPPED | OUTPATIENT
Start: 2018-12-22 | End: 2023-04-19

## 2018-09-22 RX ORDER — DEXTROAMPHETAMINE SACCHARATE, AMPHETAMINE ASPARTATE MONOHYDRATE, DEXTROAMPHETAMINE SULFATE AND AMPHETAMINE SULFATE 6.25; 6.25; 6.25; 6.25 MG/1; MG/1; MG/1; MG/1
25 CAPSULE, EXTENDED RELEASE ORAL EVERY MORNING
Qty: 30 CAPSULE | Refills: 0 | Status: SHIPPED | OUTPATIENT
Start: 2018-10-22 | End: 2018-09-22

## 2018-09-22 RX ORDER — DEXTROAMPHETAMINE SACCHARATE, AMPHETAMINE ASPARTATE MONOHYDRATE, DEXTROAMPHETAMINE SULFATE AND AMPHETAMINE SULFATE 6.25; 6.25; 6.25; 6.25 MG/1; MG/1; MG/1; MG/1
25 CAPSULE, EXTENDED RELEASE ORAL EVERY MORNING
Qty: 30 CAPSULE | Refills: 0 | Status: SHIPPED | OUTPATIENT
Start: 2018-11-22 | End: 2018-09-22

## 2018-09-22 NOTE — MR AVS SNAPSHOT
After Visit Summary   9/22/2018    Jesus Crow    MRN: [id number]           Patient Information     Date Of Birth          1995        Visit Information        Provider Department      9/22/2018 11:45 AM Brenden Piña MD [facility]        Today's Diagnoses     Need for prophylactic vaccination and inoculation against influenza        Need for prophylactic vaccination with tetanus-diphtheria (TD)        Attention deficit hyperactivity disorder (ADHD), predominantly inattentive type           Follow-ups after your visit        Follow-up notes from your care team     Return in about 4 months (around 1/22/2019).      Who to contact     If you have questions or need follow up information about today's clinic visit or your schedule please contact [facility] directly at [phone].  Normal or non-critical lab and imaging results will be communicated to you by Tzeehart, letter or phone within 4 business days after the clinic has received the results. If you do not hear from us within 7 days, please contact the clinic through Tzeehart or phone. If you have a critical or abnormal lab result, we will notify you by phone as soon as possible.  Submit refill requests through ArtCorgi or call your pharmacy and they will forward the refill request to us. Please allow 3 business days for your refill to be completed.          Additional Information About Your Visit        MyChart Information     ArtCorgi gives you secure access to your electronic health record. If you see a primary care provider, you can also send messages to your care team and make appointments. If you have questions, please call your primary care clinic.  If you do not have a primary care provider, please call [phone] and they will assist you.        Care EveryWhere ID     This is your Care EveryWhere ID. This could be used by other organizations to access your [place] medical records  [id number]    "     Your Vitals Were     Pulse Temperature Height Pulse Oximetry BMI (Body Mass Index)       83 98.1  F (36.7  C) (Oral) 5' 9\" (1.753 m) 98% 28.65 kg/m2        Blood Pressure from Last 3 Encounters:   09/22/18 138/80   06/22/18 137/72   03/22/18 128/76    Weight from Last 3 Encounters:   09/22/18 194 lb (88 kg)   06/22/18 194 lb (88 kg)   03/22/18 178 lb (80.7 kg)              Today, you had the following     No orders found for display         Today's Medication Changes          These changes are accurate as of 9/22/18 12:03 PM.  If you have any questions, ask your nurse or doctor.               Start taking these medicines.        Dose/Directions    * amphetamine-dextroamphetamine 25 MG 24 hr capsule   Commonly known as:  ADDERALL XR   Used for:  Attention deficit hyperactivity disorder (ADHD), predominantly inattentive type   Started by:  Brenden Piña MD        Dose:  25 mg   Start taking on:  12/22/2018   Take 1 capsule (25 mg) by mouth every morning   Quantity:  30 capsule   Refills:  0       * amphetamine-dextroamphetamine 10 MG per tablet   Commonly known as:  ADDERALL   Used for:  Attention deficit hyperactivity disorder (ADHD), predominantly inattentive type   Started by:  Brenden Piña MD        Dose:  10 mg   Start taking on:  12/22/2018   Take 1 tablet (10 mg) by mouth daily (before lunch)   Quantity:  30 tablet   Refills:  0       * Notice:  This list has 2 medication(s) that are the same as other medications prescribed for you. Read the directions carefully, and ask your doctor or other care provider to review them with you.         Where to get your medicines      Some of these will need a paper prescription and others can be bought over the counter.  Ask your nurse if you have questions.     Bring a paper prescription for each of these medications     amphetamine-dextroamphetamine 10 MG per tablet    amphetamine-dextroamphetamine 25 MG 24 hr capsule                Primary Care Provider Office Phone # " Fax #    Brenden Piña -[phone] [address] [facility] [address]        Equal Access to Services     JOSE MERINO : Hadii aad ku hadlilibethome Bai, [place] bailonciaranha, angie kamary mensah, kirit butler lafernandakatrin gena. So [facility] [phone].    ATENCIÓN: Si habla español, tiene a pro disposición servicios gratuitos de asistencia lingüística. Llame al [phone].    We comply with applicable federal civil rights laws and [place] laws. We do not discriminate on the basis of race, color, national origin, age, disability, sex, sexual orientation, or gender identity.            Thank you!     Thank you for choosing [facility]  for your care. Our goal is always to provide you with excellent care. Hearing back from our patients is one way we can continue to improve our services. Please take a few minutes to complete the written survey that you may receive in the mail after your visit with us. Thank you!             Your Updated Medication List - Protect others around you: Learn how to safely use, store and throw away your medicines at www.disposemymeds.org.          This list is accurate as of 9/22/18 12:03 PM.  Always use your most recent med list.                   Brand Name Dispense Instructions for use Diagnosis    ammonium lactate 12 % cream    LAC-HYDRIN    385 g    Apply topically 2 times daily as needed for dry skin    Keratosis pilaris       * amphetamine-dextroamphetamine 25 MG 24 hr capsule   Start taking on:  12/22/2018    ADDERALL XR    30 capsule    Take 1 capsule (25 mg) by mouth every morning    Attention deficit hyperactivity disorder (ADHD), predominantly inattentive type       * amphetamine-dextroamphetamine 10 MG per tablet   Start taking on:  12/22/2018    ADDERALL    30 tablet    Take 1 tablet (10 mg) by mouth daily (before lunch)    Attention deficit hyperactivity disorder (ADHD), predominantly inattentive type       * Notice:  This  list has 2 medication(s) that are the same as other medications prescribed for you. Read the directions carefully, and ask your doctor or other care provider to review them with you.

## 2018-09-22 NOTE — PROGRESS NOTES

## 2018-09-22 NOTE — PROGRESS NOTES
SUBJECTIVE:   Jesus Crow is a 22 year old male who presents to clinic today for the following health issues:      Medication Followup of adderall    Taking Medication as prescribed: yes    Side Effects:  None    Medication Helping Symptoms:  yes       Pt ADHD has improved and is feeling that the attention span, distractibility, and forgetfulness are under better control ,no adverse effect of the medication, pt has been taking pain medicine regularly, and is consistent  with his refills request  Pt feels that the medicine is high, and wondering if he can gets down on adderall XR to 25 mg daily along with adderall 10 mg short     Problem list and histories reviewed & adjusted, as indicated.  Additional history: as documented    Patient Active Problem List   Diagnosis     Allergic rhinitis     Chronic daily headache     Keratosis pilaris     Insomnia     Attention deficit hyperactivity disorder (ADHD), predominantly inattentive type     Past Surgical History:   Procedure Laterality Date     NO HISTORY OF SURGERY         Social History   Substance Use Topics     Smoking status: Former Smoker     Packs/day: 1.00     Years: 1.00     Types: Cigarettes     Smokeless tobacco: Never Used      Comment: works at [employer], quit vapor cigs 10/2017     Alcohol use 0.0 oz/week     0 Standard drinks or equivalent per week      Comment: rarely     Family History   Problem Relation Age of Onset     Hypertension Mother      Hypertension Father      Cancer Maternal Grandmother      breast cancer     C.A.D. Maternal Grandmother      Cancer Paternal Grandmother      thyroid cancer     Mental Illness Nephew      ADHD     Mental Illness Sister      ADD, Bipolar     Mental Illness Brother      ADHD           Reviewed and updated as needed this visit by clinical staff  Allergies  Meds       Reviewed and updated as needed this visit by Provider         ROS:  CONSTITUTIONAL: NEGATIVE for fever, chills, change in weight  RESP: NEGATIVE  "for significant cough or SOB  CV: NEGATIVE for chest pain, palpitations or peripheral edema    OBJECTIVE:     /80 (BP Location: Right arm, Patient Position: Chair, Cuff Size: Adult Large)  Pulse 83  Temp 98.1  F (36.7  C) (Oral)  Ht 5' 9\" (1.753 m)  Wt 194 lb (88 kg)  SpO2 98%  BMI 28.65 kg/m2  Body mass index is 28.65 kg/(m^2).  GENERAL: healthy, alert and no distress  RESP: lungs clear to auscultation - no rales, rhonchi or wheezes  CV: regular rate and rhythm, normal S1 S2, no S3 or S4, no murmur, click or rub, no peripheral edema and peripheral pulses strong  MS: no gross musculoskeletal defects noted, no edema        ASSESSMENT/PLAN:             1. Need for prophylactic vaccination and inoculation against influenza    - FLU VACCINE, SPLIT VIRUS, IM (QUADRIVALENT) [77270]- >3 YRS  - Vaccine Administration, Initial [74854]    2. Need for prophylactic vaccination with tetanus-diphtheria (TD)      3. Attention deficit hyperactivity disorder (ADHD), predominantly inattentive type  Will decrease the dose to 25 mg daily for adderall XR, keep the same dose for regular adderall.  Given 4 months supply.- amphetamine-dextroamphetamine (ADDERALL XR) 25 MG 24 hr capsule; Take 1 capsule (25 mg) by mouth every morning  Dispense: 30 capsule; Refill: 0  - amphetamine-dextroamphetamine (ADDERALL) 10 MG per tablet; Take 1 tablet (10 mg) by mouth daily (before lunch)  Dispense: 30 tablet; Refill: 0    Recheck in 4 months.    Brenden Piña MD  [facility]"

## 2018-11-19 ENCOUNTER — OFFICE VISIT (OUTPATIENT)
Dept: FAMILY MEDICINE | Facility: CLINIC | Age: 23
End: 2018-11-19
Payer: COMMERCIAL

## 2018-11-19 VITALS
RESPIRATION RATE: 20 BRPM | BODY MASS INDEX: 30.51 KG/M2 | TEMPERATURE: 98.5 F | WEIGHT: 206 LBS | SYSTOLIC BLOOD PRESSURE: 132 MMHG | HEART RATE: 80 BPM | DIASTOLIC BLOOD PRESSURE: 80 MMHG | OXYGEN SATURATION: 98 % | HEIGHT: 69 IN

## 2018-11-19 DIAGNOSIS — L72.3 INFECTED SEBACEOUS CYST: Primary | ICD-10-CM

## 2018-11-19 DIAGNOSIS — F90.0 ATTENTION DEFICIT HYPERACTIVITY DISORDER (ADHD), PREDOMINANTLY INATTENTIVE TYPE: ICD-10-CM

## 2018-11-19 DIAGNOSIS — L08.9 INFECTED SEBACEOUS CYST: Primary | ICD-10-CM

## 2018-11-19 PROCEDURE — 99214 OFFICE O/P EST MOD 30 MIN: CPT | Performed by: FAMILY MEDICINE

## 2018-11-19 RX ORDER — CEFUROXIME AXETIL 500 MG/1
500 TABLET ORAL 2 TIMES DAILY
Qty: 14 TABLET | Refills: 0 | Status: SHIPPED | OUTPATIENT
Start: 2018-11-19 | End: 2018-11-26

## 2018-11-19 RX ORDER — DEXTROAMPHETAMINE SACCHARATE, AMPHETAMINE ASPARTATE MONOHYDRATE, DEXTROAMPHETAMINE SULFATE AND AMPHETAMINE SULFATE 3.75; 3.75; 3.75; 3.75 MG/1; MG/1; MG/1; MG/1
15 CAPSULE, EXTENDED RELEASE ORAL DAILY
Qty: 60 CAPSULE | Refills: 0 | Status: SHIPPED | OUTPATIENT
Start: 2018-11-19 | End: 2023-04-19

## 2018-11-19 RX ORDER — DEXTROAMPHETAMINE SACCHARATE, AMPHETAMINE ASPARTATE, DEXTROAMPHETAMINE SULFATE AND AMPHETAMINE SULFATE 2.5; 2.5; 2.5; 2.5 MG/1; MG/1; MG/1; MG/1
10 TABLET ORAL
Qty: 30 TABLET | Refills: 0 | Status: CANCELLED | OUTPATIENT
Start: 2018-12-22

## 2018-11-19 NOTE — PROGRESS NOTES
SUBJECTIVE:   Jesus Crow is a 23 year old male who presents to clinic today for the following health issues:      Medication Followup of Adderall    Taking Medication as prescribed: yes    Side Effects:  None    Medication Helping Symptoms:  yes       Pt ADHD has improved and is feeling that the attention span, distractibility, and forgetfulness are under better control ,no adverse effect of the medication, pt has been taking pain medicine regularly, and is consistent  with his refills request  Pt has been feeling that the medicine is a bit high for him, he has been noticing that he focus sometimes too much on the subject that he ignore different signs like flashing light on the desk or other people talking to him.  And he is wondering if he can cut the dose slightly to help him with that problem.    Problem list and histories reviewed & adjusted, as indicated.  Additional history: as documented    Patient Active Problem List   Diagnosis     Allergic rhinitis     Chronic daily headache     Keratosis pilaris     Insomnia     Attention deficit hyperactivity disorder (ADHD), predominantly inattentive type     Past Surgical History:   Procedure Laterality Date     NO HISTORY OF SURGERY         Social History   Substance Use Topics     Smoking status: Former Smoker     Packs/day: 1.00     Years: 1.00     Types: Cigarettes     Smokeless tobacco: Never Used      Comment: works at [employer], quit vapor cigs 10/2017     Alcohol use 0.0 oz/week     0 Standard drinks or equivalent per week      Comment: rarely     Family History   Problem Relation Age of Onset     Hypertension Mother      Hypertension Father      Cancer Maternal Grandmother      breast cancer     C.A.D. Maternal Grandmother      Cancer Paternal Grandmother      thyroid cancer     Mental Illness Nephew      ADHD     Mental Illness Sister      ADD, Bipolar     Mental Illness Brother      ADHD           Reviewed and updated as needed this visit by clinical  "staff  Tobacco  Allergies  Meds  Med Hx  Surg Hx  Fam Hx  Soc Hx      Reviewed and updated as needed this visit by Provider         ROS:  CONSTITUTIONAL: NEGATIVE for fever, chills, change in weight  CV: NEGATIVE for chest pain, palpitations or peripheral edema  skiN : there is a lesion on the Lt ear that has been irritating him on and off, and causing him some pain in the ear lobe.    OBJECTIVE:     /80 (BP Location: Right arm, Patient Position: Chair, Cuff Size: Adult Large)  Pulse 80  Temp 98.5  F (36.9  C) (Oral)  Resp 20  Ht 5' 9\" (1.753 m)  Wt 206 lb (93.4 kg)  SpO2 98%  BMI 30.42 kg/m2  Body mass index is 30.42 kg/(m^2).  GENERAL: healthy, alert and no distress  RESP: lungs clear to auscultation - no rales, rhonchi or wheezes  CV: regular rate and rhythm, normal S1 S2, no S3 or S4, no murmur, click or rub, no peripheral edema and peripheral pulses strong  MS: no gross musculoskeletal defects noted, no edema  Skin: small red cyst that is slightly tender to touch on the Lt ear auricle.      ASSESSMENT/PLAN:             1. Attention deficit hyperactivity disorder (ADHD), predominantly inattentive type  Reduce dose to 15 mg daily instead of 25 mg XR daily. Will give 60 pills which will hold him for 2 months (I took the prescription for november and December and discarded them)  - amphetamine-dextroamphetamine (ADDERALL XR) 15 MG per 24 hr capsule; Take 1 capsule (15 mg) by mouth daily  Dispense: 60 capsule; Refill: 0  So in 2 months By January 20th or so, pt will ask for 2 months supply on his adderall medications (including adderall XR 15 mg, and for short acting adderal 10 mg) and we should be able to refill 2 months supply them.      2. Infected sebaceous cyst  Will start on   - cefuroxime (CEFTIN) 500 MG tablet; Take 1 tablet (500 mg) by mouth 2 times daily for 7 days  Dispense: 14 tablet; Refill: 0    Follow up in 7 days if symptoms persist, sooner if symptoms worsen or new ones develops, " pt may contact us over the phone for any questions or concerns.      Brenden Piña MD  [facility]

## 2018-11-19 NOTE — MR AVS SNAPSHOT
After Visit Summary   11/19/2018    Jesus Crow    MRN: [id number]           Patient Information     Date Of Birth          1995        Visit Information        Provider Department      11/19/2018 11:45 AM Brenden Piña MD [facility]        Today's Diagnoses     Infected sebaceous cyst    -  1    Attention deficit hyperactivity disorder (ADHD), predominantly inattentive type           Follow-ups after your visit        Follow-up notes from your care team     Return in about 4 months (around 3/19/2019).      Who to contact     If you have questions or need follow up information about today's clinic visit or your schedule please contact [facility] directly at [phone].  Normal or non-critical lab and imaging results will be communicated to you by MyChart, letter or phone within 4 business days after the clinic has received the results. If you do not hear from us within 7 days, please contact the clinic through [employer] or phone. If you have a critical or abnormal lab result, we will notify you by phone as soon as possible.  Submit refill requests through [employer] or call your pharmacy and they will forward the refill request to us. Please allow 3 business days for your refill to be completed.          Additional Information About Your Visit        MyChart Information     [employer] gives you secure access to your electronic health record. If you see a primary care provider, you can also send messages to your care team and make appointments. If you have questions, please call your primary care clinic.  If you do not have a primary care provider, please call [phone] and they will assist you.        Care EveryWhere ID     This is your Care EveryWhere ID. This could be used by other organizations to access your [facility] medical records  [id number]        Your Vitals Were     Pulse Temperature Respirations Height Pulse Oximetry BMI (Body Mass Index)    80  "98.5  F (36.9  C) (Oral) 20 5' 9\" (1.753 m) 98% 30.42 kg/m2       Blood Pressure from Last 3 Encounters:   11/19/18 132/80   09/22/18 138/80   06/22/18 137/72    Weight from Last 3 Encounters:   11/19/18 206 lb (93.4 kg)   09/22/18 194 lb (88 kg)   06/22/18 194 lb (88 kg)              Today, you had the following     No orders found for display         Today's Medication Changes          These changes are accurate as of 11/19/18 12:05 PM.  If you have any questions, ask your nurse or doctor.               Start taking these medicines.        Dose/Directions    cefuroxime 500 MG tablet   Commonly known as:  CEFTIN   Used for:  Infected sebaceous cyst   Started by:  Brenden Piña MD        Dose:  500 mg   Take 1 tablet (500 mg) by mouth 2 times daily for 7 days   Quantity:  14 tablet   Refills:  0         These medicines have changed or have updated prescriptions.        Dose/Directions    * amphetamine-dextroamphetamine 15 MG per 24 hr capsule   Commonly known as:  ADDERALL XR   This may have changed:  You were already taking a medication with the same name, and this prescription was added. Make sure you understand how and when to take each.   Used for:  Attention deficit hyperactivity disorder (ADHD), predominantly inattentive type   Replaces:  amphetamine-dextroamphetamine 25 MG 24 hr capsule   Changed by:  Brenden Piña MD        Dose:  15 mg   Take 1 capsule (15 mg) by mouth daily   Quantity:  60 capsule   Refills:  0       * amphetamine-dextroamphetamine 10 MG per tablet   Commonly known as:  ADDERALL   This may have changed:    - Another medication with the same name was added. Make sure you understand how and when to take each.  - Another medication with the same name was removed. Continue taking this medication, and follow the directions you see here.   Used for:  Attention deficit hyperactivity disorder (ADHD), predominantly inattentive type   Changed by:  Brenden Piña MD        Dose:  10 mg   Start taking on:  " 12/22/2018   Take 1 tablet (10 mg) by mouth daily (before lunch)   Quantity:  30 tablet   Refills:  0       * Notice:  This list has 2 medication(s) that are the same as other medications prescribed for you. Read the directions carefully, and ask your doctor or other care provider to review them with you.      Stop taking these medicines if you haven't already. Please contact your care team if you have questions.     amphetamine-dextroamphetamine 25 MG 24 hr capsule   Commonly known as:  ADDERALL XR   Replaced by:  amphetamine-dextroamphetamine 15 MG per 24 hr capsule   You also have another medication with the same name that you need to continue taking as instructed.   Stopped by:  Brenden Piña MD                Where to get your medicines      These medications were sent to [facility] [address] [place] [address] [address] [place] [address] AT [facility] [address] [facility] [address]     Phone:  [phone]     cefuroxime 500 MG tablet         Some of these will need a paper prescription and others can be bought over the counter.  Ask your nurse if you have questions.     Bring a paper prescription for each of these medications     amphetamine-dextroamphetamine 15 MG per 24 hr capsule                Primary Care Provider Office Phone # Fax #    Brenden Piña [phone] [address] [facility] [address]        Equal Access to Services     GAVIN MERINO AH: Hadii primo landrum hadasho Somorgan, waaxda luqadaha, qaybta kaalmada adegrazynayada, kirit avery. So [facility] [phone].    ATENCIÓN: Si habla español, tiene a pro disposición servicios gratuitos de asistencia lingüística. Halle al [phone].    We comply with applicable federal civil rights laws and [place] laws. We do not discriminate on the basis of race, color, national origin, age, disability, sex, sexual orientation, or gender identity.            Thank you!     Thank you for choosing  [facility]  for your care. Our goal is always to provide you with excellent care. Hearing back from our patients is one way we can continue to improve our services. Please take a few minutes to complete the written survey that you may receive in the mail after your visit with us. Thank you!             Your Updated Medication List - Protect others around you: Learn how to safely use, store and throw away your medicines at www.disposemymeds.org.          This list is accurate as of 11/19/18 12:05 PM.  Always use your most recent med list.                   Brand Name Dispense Instructions for use Diagnosis    ammonium lactate 12 % cream    LAC-HYDRIN    385 g    Apply topically 2 times daily as needed for dry skin    Keratosis pilaris       * amphetamine-dextroamphetamine 15 MG per 24 hr capsule    ADDERALL XR    60 capsule    Take 1 capsule (15 mg) by mouth daily    Attention deficit hyperactivity disorder (ADHD), predominantly inattentive type       * amphetamine-dextroamphetamine 10 MG per tablet   Start taking on:  12/22/2018    ADDERALL    30 tablet    Take 1 tablet (10 mg) by mouth daily (before lunch)    Attention deficit hyperactivity disorder (ADHD), predominantly inattentive type       cefuroxime 500 MG tablet    CEFTIN    14 tablet    Take 1 tablet (500 mg) by mouth 2 times daily for 7 days    Infected sebaceous cyst       * Notice:  This list has 2 medication(s) that are the same as other medications prescribed for you. Read the directions carefully, and ask your doctor or other care provider to review them with you.

## 2018-12-19 NOTE — PROGRESS NOTES
"                                             Progress Note    Client Name: Jesus Crow  Date: 2/24/17         Service Type: Individual      Session Start Time: 8:00  Session End Time: 8:45      Session Length: 45     Session #: 3     Attendees: Client attended alone       DATA         Episode of Care Goals: complete psychological evaluation     Current / Ongoing Stressors and Concerns:   Wants to return to school. Dissatisfied with current job and wants to advance his education and career.      Treatment Objective(s) Addressed in This Session:   review results  Provide recommendations     Intervention:   This was a feedback session with Client to review results of ADHD assessment. Reviewed data from self- and collateral-report standardized questionnaires, which provided consistent information overall. Taken together, data obtained in this assessment is consistent with a diagnosis of ADHD, combined type. Implications of these results and recommendations for next steps were discussed in detail with Client. Offered resources including [facility] handout \"Adult ADHD tips and suggestions\" as well as information about print and online resources and local support groups. Client's questions were answered. See [facility] Extended Documentation dated 4/24/2017 for full report.  Client's questions were answered.        ASSESSMENT: Current Emotional / Mental Status (status of significant symptoms):   Risk status (Self / Other harm or suicidal ideation)   Client denies current fears or concerns for personal safety.   Client denies current or recent suicidal ideation or behaviors.   Client denies current or recent homicidal ideation or behaviors.   Client denies current or recent self injurious behavior or ideation.   Client denies other safety concerns.   A safety and risk management plan has not been developed at this time, however client was given the after-hours number / 911 should there be a change in any of these risk " factors.     Appearance:   Appropriate    Eye Contact:   Good    Psychomotor Behavior: Normal    Attitude:   Cooperative    Orientation:   All   Speech    Rate / Production: Normal     Volume:  Normal    Mood:    Normal   Affect:    Appropriate    Thought Content:  Clear    Thought Form:  distractible   Insight:    Good      Medication Review:   No current psychiatric medications prescribed     Medication Compliance:   NA     Changes in Health Issues:   None reported     Chemical Use Review:   Substance Use: Chemical use reviewed, no active concerns identified      Tobacco Use: No current tobacco use.       Collateral Reports Completed:   Not Applicable    PLAN: (Client Tasks / Therapist Tasks / Other)  1. Schedule an appointment with your physician to discuss the pros/cons of a medication trial. Medication would likely be helpful for improving focus and follow through with tasks. It was explained to Client that medication alone is unlikely to address all of his symptoms, and that learning new study habits and taking advantage of accommodations will be necessary to be most successful when he returns to school.   2. Access resources through websites, books, and articles such as those provided  in the handout.  3. Consider working with an ADHD  or individual therapist to learn skills to  assist with symptom management, as well as ways to improve relationships,  etc that may have been impacted by your symptoms.   4. Consider attending workshops or support groups through [email or web address]).   5. Schedule a follow-up appointment with me in about six weeks to review symptoms, treatment involvement, and struggles and/or successes.        Jane Tompkins PsyD LP                                                          Yes

## 2023-03-20 ENCOUNTER — MYC REFILL (OUTPATIENT)
Dept: FAMILY MEDICINE | Facility: CLINIC | Age: 28
End: 2023-03-20
Payer: COMMERCIAL

## 2023-03-20 DIAGNOSIS — F90.0 ATTENTION DEFICIT HYPERACTIVITY DISORDER (ADHD), PREDOMINANTLY INATTENTIVE TYPE: ICD-10-CM

## 2023-03-21 RX ORDER — DEXTROAMPHETAMINE SACCHARATE, AMPHETAMINE ASPARTATE MONOHYDRATE, DEXTROAMPHETAMINE SULFATE AND AMPHETAMINE SULFATE 3.75; 3.75; 3.75; 3.75 MG/1; MG/1; MG/1; MG/1
15 CAPSULE, EXTENDED RELEASE ORAL DAILY
Qty: 60 CAPSULE | Refills: 0 | OUTPATIENT
Start: 2023-03-21

## 2023-03-21 NOTE — TELEPHONE ENCOUNTER
Routing refill request to provider for review/approval because:  Drug not on the [facility] refill protocol     Rc GASTON RN 3/21/2023 at 10:42 AM

## 2023-03-21 NOTE — TELEPHONE ENCOUNTER
Notified patient Adderall denied. Patient states he has new insurance and can be seen again.     Scheduled for appointment tomorrow to discuss medication.    Holli Murphy RN

## 2023-03-22 ENCOUNTER — OFFICE VISIT (OUTPATIENT)
Dept: FAMILY MEDICINE | Facility: CLINIC | Age: 28
End: 2023-03-22
Payer: COMMERCIAL

## 2023-03-22 VITALS
TEMPERATURE: 98.6 F | SYSTOLIC BLOOD PRESSURE: 137 MMHG | BODY MASS INDEX: 31.37 KG/M2 | HEIGHT: 69 IN | WEIGHT: 211.8 LBS | OXYGEN SATURATION: 97 % | DIASTOLIC BLOOD PRESSURE: 83 MMHG | RESPIRATION RATE: 16 BRPM | HEART RATE: 71 BPM

## 2023-03-22 DIAGNOSIS — Z11.4 SCREENING FOR HIV (HUMAN IMMUNODEFICIENCY VIRUS): ICD-10-CM

## 2023-03-22 DIAGNOSIS — L98.9 SKIN LESION: ICD-10-CM

## 2023-03-22 DIAGNOSIS — F90.0 ATTENTION DEFICIT HYPERACTIVITY DISORDER (ADHD), PREDOMINANTLY INATTENTIVE TYPE: Primary | ICD-10-CM

## 2023-03-22 DIAGNOSIS — Z11.59 NEED FOR HEPATITIS C SCREENING TEST: ICD-10-CM

## 2023-03-22 DIAGNOSIS — Z23 NEED FOR PROPHYLACTIC VACCINATION AND INOCULATION AGAINST INFLUENZA: ICD-10-CM

## 2023-03-22 PROCEDURE — 90471 IMMUNIZATION ADMIN: CPT | Performed by: FAMILY MEDICINE

## 2023-03-22 PROCEDURE — 90472 IMMUNIZATION ADMIN EACH ADD: CPT | Performed by: FAMILY MEDICINE

## 2023-03-22 PROCEDURE — 99204 OFFICE O/P NEW MOD 45 MIN: CPT | Mod: 25 | Performed by: FAMILY MEDICINE

## 2023-03-22 PROCEDURE — 91312 COVID-19 VACCINE BIVALENT BOOSTER 12+ (PFIZER): CPT | Performed by: FAMILY MEDICINE

## 2023-03-22 PROCEDURE — 90715 TDAP VACCINE 7 YRS/> IM: CPT | Performed by: FAMILY MEDICINE

## 2023-03-22 PROCEDURE — 90686 IIV4 VACC NO PRSV 0.5 ML IM: CPT | Performed by: FAMILY MEDICINE

## 2023-03-22 PROCEDURE — 88305 TISSUE EXAM BY PATHOLOGIST: CPT | Performed by: PATHOLOGY

## 2023-03-22 PROCEDURE — 11200 RMVL SKIN TAGS UP TO&INC 15: CPT | Performed by: FAMILY MEDICINE

## 2023-03-22 PROCEDURE — 0124A COVID-19 VACCINE BIVALENT BOOSTER 12+ (PFIZER): CPT | Performed by: FAMILY MEDICINE

## 2023-03-22 RX ORDER — DEXTROAMPHETAMINE SACCHARATE, AMPHETAMINE ASPARTATE MONOHYDRATE, DEXTROAMPHETAMINE SULFATE AND AMPHETAMINE SULFATE 2.5; 2.5; 2.5; 2.5 MG/1; MG/1; MG/1; MG/1
10 CAPSULE, EXTENDED RELEASE ORAL DAILY
Qty: 30 CAPSULE | Refills: 0 | Status: SHIPPED | OUTPATIENT
Start: 2023-03-22 | End: 2023-07-24

## 2023-03-22 NOTE — LETTER
[facility]  03/22/23  Patient: Jesus Crow  YOB: 1995  Medical Record Number: [id number]                                                                                  Non-Opioid Controlled Substance Agreement    This is an agreement between you and your provider regarding safe and appropriate use of controlled substances prescribed by your care team. Controlled substances are?medicines that can cause physical and mental dependence (abuse).     There are strict laws about having and using these medicines. We here at [facility] are  committed to working with you in your efforts to get better. To support you in this work, we'll help you schedule regular office appointments for medicine refills. If we must cancel or change your appointment for any reason, we'll make sure you have enough medicine to last until your next appointment.     As a Provider, I will:     Listen carefully to your concerns while treating you with respect.     Recommend a treatment plan that I believe is in your best interest and may involve therapies other than medicine.      Talk with you often about the possible benefits and the risk of harm of any medicine that we prescribe for you.    Assess the safety of this medicine and check how well it works.      Provide a plan on how to taper (discontinue or go off) using this medicine if the decision is made to stop its use.      ::  As a Patient, I understand controlled substances:       Are prescribed by my care provider to help me function or work and manage my condition(s).?    Are strong medicines and can cause serious side effects.       Need to be taken exactly as prescribed.?Combining controlled substances with certain medicines or chemicals (such as illegal drugs, alcohol, sedatives, sleeping pills, and benzodiazepines) can be dangerous or even fatal.? If I stop taking my medicines suddenly, I may have severe withdrawal symptoms.     The  risks, benefits, and side effects of these medicine(s) were explained to me. I agree that:    1. I will take part in other treatments as advised by my care team. This may be psychiatry or counseling, physical therapy, behavioral therapy, group treatment or a referral to specialist.    2. I will keep all my appointments and understand this is part of the monitoring of controlled substances.?My care team may require an office visit for EVERY controlled substance refill. If I miss appointments or don t follow instructions, my care team may stop my medicine    3. I will take my medicines as prescribed. I will not change the dose or schedule unless my care team tells me to. There will be no refills if I run out early.      4. I may be asked to come to the clinic and complete a urine drug test or complete a pill count. If I don t give a urine sample or participate in a pill count, the care team may stop my medicine.    5. I will only receive controlled substance prescriptions from this clinic. If I am treated by another provider, I will tell them that I am taking controlled substances and that I have a treatment agreement with this provider. I will inform my [facility] care team within one business day if I am given a prescription for any controlled substance by another healthcare provider. My [facility] care team can contact other providers and pharmacists about my use of any medicines.    6. It is up to me to make sure that I don't run out of my medicines on weekends or holidays.?If my care team is willing to refill my prescription without a visit, I must request refills only during office hours. Refills may take up to 3 business days to process. I will use one pharmacy to fill all my controlled substance prescriptions. I will notify the clinic about any changes to my insurance or medicine availability.    7. I am responsible for my prescriptions. If the medicine/prescription is lost, stolen or destroyed,  it will not be replaced.?I also agree not to share controlled substance medicines with anyone.     8. I am aware I should not use any illegal or recreational drugs. I agree not to drink alcohol unless my care team says I can.     9. If I enroll in the [place] Medical Cannabis program, I will tell my care team before my next refill.    10. I will tell my care team right away if I become pregnant, have a new medical problem treated outside of my regular clinic, or have a change in my medicines.     11. I understand that this medicine can affect my thinking, judgment and reaction time.? Alcohol and drugs affect the brain and body, which can affect the safety of my driving. Being under the influence of alcohol or drugs can affect my decision-making, behaviors, personal safety and the safety of others. Driving while impaired (DWI) can occur if a person is driving, operating or in physical control of a car, motorcycle, boat, snowmobile, ATV, motorbike, off-road vehicle or any other motor vehicle ([place] Statute 169A.20). I understand the risk if I choose to drive or operate any vehicle or machinery.    I understand that if I do not follow any of the conditions above, my prescriptions or treatment may be stopped or changed.   I agree that my provider, clinic care team and pharmacy may work with any city, state or federal law enforcement agency that investigates the misuse, sale or other diversion of my controlled medicine. I will allow my provider to discuss my care with, or share a copy of, this agreement with any other treating provider, pharmacy or emergency room where I receive care.     I have read this agreement and have asked questions about anything I did not understand.    ________________________________________________________  Patient Signature - Jesus Crow     ___________________                   Date     ________________________________________________________  Provider Signature - Brenden Piña MD        ___________________                   Date     ________________________________________________________  Witness Signature (required if provider not present while patient signing)          ___________________                   Date

## 2023-03-22 NOTE — PROGRESS NOTES
"  Assessment & Plan     Screening for HIV (human immunodeficiency virus)      Need for hepatitis C screening test      Skin lesion  Mostly skin tag, very large, After cleaning the area,, and skin tag was removed using scissors, lesion was  sent to pathology.    - Surgical Pathology Exam  - SHAV SKIN LESION TRUNK/ARM/LEG <=0.5 CM    Attention deficit hyperactivity disorder (ADHD), predominantly inattentive type  Recurred symptoms, worsening, given the current situation with patient going to school, will restart on   - amphetamine-dextroamphetamine (ADDERALL XR) 10 MG 24 hr capsule; Take 1 capsule (10 mg) by mouth daily  Recheck in 2 months. (may refill if needed until patient is seen).  Contract established.    Need for prophylactic vaccination and inoculation against influenza    - INFLUENZA VACCINE IM > 6 MONTHS VALENT IIV4 (AFLURIA/FLUZONE)             BMI:   Estimated body mass index is 31.74 kg/m  as calculated from the following:    Height as of this encounter: 1.74 m (5' 8.5\").    Weight as of this encounter: 96.1 kg (211 lb 12.8 oz).   Weight management plan: Discussed healthy diet and exercise guidelines        Brenden Piña MD  [facility] ASHISH Santana is a 27 year old, presenting for the following health issues:  Recheck Medication    Additional Questions 3/22/2023   Roomed by Brandi Zamora CMA     History of Present Illness       Reason for visit:  Adhd    He eats 0-1 servings of fruits and vegetables daily.He consumes 0 sweetened beverage(s) daily.He exercises with enough effort to increase his heart rate 20 to 29 minutes per day.  He exercises with enough effort to increase his heart rate 4 days per week.   He is taking medications regularly.       Medication Followup of Adderall 15mg     Taking Medication as prescribed: NO    Side Effects:  N/A    Medication Helping Symptoms:  not applicable    Patient is currently off this medication and looking to discuss getting back on " "this.  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  He was first diagnosed in 2017, and he was seen by psychiatry and advised to continue on adderall, pt wishes to go back to same medicine with low dose adderall XR.  Pt quit using alcohol completely for 1 year ago, and he also quit E cigarette,       Also pt has been having lesion on the back that has been growing in the last 1 year.  Review of Systems         Objective    /83 (BP Location: Right arm, Patient Position: Sitting, Cuff Size: Adult Large)   Pulse 71   Temp 98.6  F (37  C) (Oral)   Resp 16   Ht 1.74 m (5' 8.5\")   Wt 96.1 kg (211 lb 12.8 oz)   SpO2 97%   BMI 31.74 kg/m    Body mass index is 31.74 kg/m .  Physical Exam   GENERAL: healthy, alert and no distress  NECK: no adenopathy, no asymmetry, masses, or scars and thyroid normal to palpation  RESP: lungs clear to auscultation - no rales, rhonchi or wheezes  CV: regular rate and rhythm, normal S1 S2, no S3 or S4, no murmur, click or rub, no peripheral edema and peripheral pulses strong  MS: no gross musculoskeletal defects noted, no edema  SKIN: large pedunculated tumor hanging from the skin, flesh color, and consistency, about 1.5 cm in size.                     "

## 2023-03-27 LAB
PATH REPORT.COMMENTS IMP SPEC: NORMAL
PATH REPORT.FINAL DX SPEC: NORMAL
PATH REPORT.GROSS SPEC: NORMAL
PATH REPORT.MICROSCOPIC SPEC OTHER STN: NORMAL
PATH REPORT.RELEVANT HX SPEC: NORMAL
PHOTO IMAGE: NORMAL

## 2023-03-28 ENCOUNTER — TELEPHONE (OUTPATIENT)
Dept: FAMILY MEDICINE | Facility: CLINIC | Age: 28
End: 2023-03-28

## 2023-03-28 ENCOUNTER — E-CONSULT (OUTPATIENT)
Dept: DERMATOLOGY | Facility: CLINIC | Age: 28
End: 2023-03-28
Payer: COMMERCIAL

## 2023-03-28 DIAGNOSIS — D48.19: Primary | ICD-10-CM

## 2023-03-28 PROCEDURE — 99451 NTRPROF PH1/NTRNET/EHR 5/>: CPT | Performed by: DERMATOLOGY

## 2023-03-28 PROCEDURE — 99207 E-CONSULT TO DERMATOLOGY (ADULT OUTPT PROVIDER TO SPECIALIST WRITTEN QUESTION & RESPONSE): CPT | Performed by: FAMILY MEDICINE

## 2023-03-28 NOTE — TELEPHONE ENCOUNTER
Called patient and discussed below.  Patient agrees to E-Consult.  Aware may be charged for this visit.  Advised we will get back to him with plan.  Norma Rivera RN

## 2023-03-28 NOTE — TELEPHONE ENCOUNTER
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   But I wonder if there is any further treatment is needed for this.  Will it be ok if I do E consult with derm?  Brenden Piña MD  [facility]  [phone]

## 2023-03-29 NOTE — PROGRESS NOTES
3/28/2023     E-Consult has been accepted.    Interprofessional consultation requested by:  Brenden Piña MD      Clinical Question/Purpose: MY CLINICAL QUESTION IS: pt had a lesion on the trunk for 2 years, slowly growing, I removed it using scissors as it was pedunculated on thin stem.     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 They have a tendency to recur following removal. While they are found in less than half of Garcia complex patients, their presence strongly points to Garcia complex when discovered. For this reason, patients with cutaneous myxomas should be screened for other findings of Garcia complex including lentigines, lipomas, blue nevi, cardiac myxomas, and various endocrine and nonendocrine tumors including cardiac myxomas, pituitary or adrenal tumors (may be hormonally active), thyroid nodules, and testicular cancers.    Confirmation of a clinically consistent diagnosis of cutaneous myxoma is not possible as there are no clinical photos of the biopsied lesion to review. Strongly recommend photographing all skin lesions and rashes prior to biopsy - this significantly reduces the rate of wrong site surgeries when further procedures are required, as well as resolving clinicopathologic dissonance in the case of unexpected histopathologic results as seemingly occurred in this case. This can easily be incorporated into biopsy workflow through Epic Haiku on a smartphone or Epic Tariq on an iPad. Additionally, strongly recommend sending skin biopsies to a dermatopathologist through the Derm Specimen navigator in Epic. A board-certified dermatopathologist has greater expertise in evaluating cutaneous lesions and rashes, and provides a microscopic description that further aids diagnostic confirmation (note that a microscopic  description is absent in the report for this patient).      The recommendations provided in this E-Consult are based on a review of clinical data pertinent to the clinical question presented, without a review of the patient's complete medical record or, the benefit of a comprehensive in-person or virtual patient evaluation. This consultation should not replace the clinical judgement and evaluation of the provider ordering this E-Consult. Any new clinical issues, or changes in patient status since the filing of this E-Consult will need to be taken into account when assessing these recommendations. Please contact me if you have further questions.    My total time spent reviewing clinical information and formulating assessment was 15 minutes.    Mane Ambrose MD, FAAD   of Dermatology  Department of Dermatology  [facility]

## 2023-03-30 NOTE — TELEPHONE ENCOUNTER
Please call Max, the Dermatology reassured me that the lesion is benign, but it may grow back, so if it does let us know.  Otherwise, is there multiple pigmented lesions spread around the mouth, face, all over the body? Blue mole? Multiple lipomas? Or any one in his family with Carny complex syndrome?  If not, then I think it is reasonable to watch and wait, no further testing is needed.

## 2023-03-30 NOTE — TELEPHONE ENCOUNTER
Notified Patient of provider's message. He does not have any other lesions and is not aware of anyone in his family who has Garcia Complex syndrome.    Person was given an opportunity to ask questions, verbalized understanding of plan, and is agreeable.     Holli Murphy RN

## 2023-04-01 ENCOUNTER — HEALTH MAINTENANCE LETTER (OUTPATIENT)
Age: 28
End: 2023-04-01

## 2023-04-12 SDOH — ECONOMIC STABILITY: FOOD INSECURITY: WITHIN THE PAST 12 MONTHS, YOU WORRIED THAT YOUR FOOD WOULD RUN OUT BEFORE YOU GOT MONEY TO BUY MORE.: NEVER TRUE

## 2023-04-12 SDOH — ECONOMIC STABILITY: TRANSPORTATION INSECURITY
IN THE PAST 12 MONTHS, HAS LACK OF TRANSPORTATION KEPT YOU FROM MEETINGS, WORK, OR FROM GETTING THINGS NEEDED FOR DAILY LIVING?: NO

## 2023-04-12 SDOH — ECONOMIC STABILITY: FOOD INSECURITY: WITHIN THE PAST 12 MONTHS, THE FOOD YOU BOUGHT JUST DIDN'T LAST AND YOU DIDN'T HAVE MONEY TO GET MORE.: NEVER TRUE

## 2023-04-12 SDOH — HEALTH STABILITY: PHYSICAL HEALTH: ON AVERAGE, HOW MANY DAYS PER WEEK DO YOU ENGAGE IN MODERATE TO STRENUOUS EXERCISE (LIKE A BRISK WALK)?: 6 DAYS

## 2023-04-12 SDOH — ECONOMIC STABILITY: TRANSPORTATION INSECURITY
IN THE PAST 12 MONTHS, HAS THE LACK OF TRANSPORTATION KEPT YOU FROM MEDICAL APPOINTMENTS OR FROM GETTING MEDICATIONS?: NO

## 2023-04-12 SDOH — HEALTH STABILITY: PHYSICAL HEALTH: ON AVERAGE, HOW MANY MINUTES DO YOU ENGAGE IN EXERCISE AT THIS LEVEL?: 30 MIN

## 2023-04-12 SDOH — ECONOMIC STABILITY: INCOME INSECURITY: IN THE LAST 12 MONTHS, WAS THERE A TIME WHEN YOU WERE NOT ABLE TO PAY THE MORTGAGE OR RENT ON TIME?: NO

## 2023-04-12 SDOH — ECONOMIC STABILITY: INCOME INSECURITY: HOW HARD IS IT FOR YOU TO PAY FOR THE VERY BASICS LIKE FOOD, HOUSING, MEDICAL CARE, AND HEATING?: SOMEWHAT HARD

## 2023-04-12 ASSESSMENT — ENCOUNTER SYMPTOMS
SORE THROAT: 0
CHILLS: 0
HEARTBURN: 0
DIARRHEA: 0
CONSTIPATION: 0
PARESTHESIAS: 0
COUGH: 0
PALPITATIONS: 0
HEMATOCHEZIA: 0
FREQUENCY: 0
MYALGIAS: 0
SHORTNESS OF BREATH: 0
WEAKNESS: 0
HEADACHES: 0
NAUSEA: 0
HEMATURIA: 0
JOINT SWELLING: 0
DYSURIA: 0
ARTHRALGIAS: 0
FEVER: 0
ABDOMINAL PAIN: 0
EYE PAIN: 0
NERVOUS/ANXIOUS: 0
DIZZINESS: 0

## 2023-04-12 ASSESSMENT — LIFESTYLE VARIABLES
HOW MANY STANDARD DRINKS CONTAINING ALCOHOL DO YOU HAVE ON A TYPICAL DAY: PATIENT DOES NOT DRINK
AUDIT-C TOTAL SCORE: 0
HOW OFTEN DO YOU HAVE A DRINK CONTAINING ALCOHOL: NEVER
SKIP TO QUESTIONS 9-10: 1
HOW OFTEN DO YOU HAVE SIX OR MORE DRINKS ON ONE OCCASION: NEVER

## 2023-04-12 ASSESSMENT — SOCIAL DETERMINANTS OF HEALTH (SDOH)
IN A TYPICAL WEEK, HOW MANY TIMES DO YOU TALK ON THE PHONE WITH FAMILY, FRIENDS, OR NEIGHBORS?: MORE THAN THREE TIMES A WEEK
HOW OFTEN DO YOU GET TOGETHER WITH FRIENDS OR RELATIVES?: ONCE A WEEK
HOW OFTEN DO YOU ATTEND CHURCH OR RELIGIOUS SERVICES?: PATIENT DECLINED
ARE YOU MARRIED, WIDOWED, DIVORCED, SEPARATED, NEVER MARRIED, OR LIVING WITH A PARTNER?: NEVER MARRIED
DO YOU BELONG TO ANY CLUBS OR ORGANIZATIONS SUCH AS CHURCH GROUPS UNIONS, FRATERNAL OR ATHLETIC GROUPS, OR SCHOOL GROUPS?: NO

## 2023-04-19 ENCOUNTER — OFFICE VISIT (OUTPATIENT)
Dept: FAMILY MEDICINE | Facility: CLINIC | Age: 28
End: 2023-04-19
Attending: FAMILY MEDICINE
Payer: COMMERCIAL

## 2023-04-19 VITALS
BODY MASS INDEX: 30.81 KG/M2 | TEMPERATURE: 98.3 F | DIASTOLIC BLOOD PRESSURE: 86 MMHG | HEIGHT: 69 IN | HEART RATE: 74 BPM | OXYGEN SATURATION: 99 % | RESPIRATION RATE: 14 BRPM | SYSTOLIC BLOOD PRESSURE: 142 MMHG | WEIGHT: 208 LBS

## 2023-04-19 DIAGNOSIS — R03.0 ELEVATED BLOOD PRESSURE READING WITHOUT DIAGNOSIS OF HYPERTENSION: ICD-10-CM

## 2023-04-19 DIAGNOSIS — Z00.00 ROUTINE HISTORY AND PHYSICAL EXAMINATION OF ADULT: Primary | ICD-10-CM

## 2023-04-19 DIAGNOSIS — Z11.59 NEED FOR HEPATITIS C SCREENING TEST: ICD-10-CM

## 2023-04-19 DIAGNOSIS — Z11.4 SCREENING FOR HIV (HUMAN IMMUNODEFICIENCY VIRUS): ICD-10-CM

## 2023-04-19 DIAGNOSIS — F90.0 ATTENTION DEFICIT HYPERACTIVITY DISORDER (ADHD), PREDOMINANTLY INATTENTIVE TYPE: ICD-10-CM

## 2023-04-19 PROCEDURE — 36415 COLL VENOUS BLD VENIPUNCTURE: CPT | Performed by: FAMILY MEDICINE

## 2023-04-19 PROCEDURE — 86803 HEPATITIS C AB TEST: CPT | Performed by: FAMILY MEDICINE

## 2023-04-19 PROCEDURE — 87389 HIV-1 AG W/HIV-1&-2 AB AG IA: CPT | Performed by: FAMILY MEDICINE

## 2023-04-19 PROCEDURE — 99395 PREV VISIT EST AGE 18-39: CPT | Performed by: FAMILY MEDICINE

## 2023-04-19 PROCEDURE — 2894A URINE DRUG CONFIRMATION PANEL: CPT | Performed by: FAMILY MEDICINE

## 2023-04-19 PROCEDURE — 80061 LIPID PANEL: CPT | Performed by: FAMILY MEDICINE

## 2023-04-19 PROCEDURE — 99213 OFFICE O/P EST LOW 20 MIN: CPT | Mod: 25 | Performed by: FAMILY MEDICINE

## 2023-04-19 RX ORDER — DEXTROAMPHETAMINE SACCHARATE, AMPHETAMINE ASPARTATE MONOHYDRATE, DEXTROAMPHETAMINE SULFATE AND AMPHETAMINE SULFATE 2.5; 2.5; 2.5; 2.5 MG/1; MG/1; MG/1; MG/1
10 CAPSULE, EXTENDED RELEASE ORAL DAILY
Qty: 30 CAPSULE | Refills: 0 | Status: SHIPPED | OUTPATIENT
Start: 2023-06-20 | End: 2023-06-26

## 2023-04-19 RX ORDER — DEXTROAMPHETAMINE SACCHARATE, AMPHETAMINE ASPARTATE MONOHYDRATE, DEXTROAMPHETAMINE SULFATE AND AMPHETAMINE SULFATE 2.5; 2.5; 2.5; 2.5 MG/1; MG/1; MG/1; MG/1
10 CAPSULE, EXTENDED RELEASE ORAL DAILY
Qty: 30 CAPSULE | Refills: 0 | Status: SHIPPED | OUTPATIENT
Start: 2023-04-19 | End: 2023-05-19

## 2023-04-19 RX ORDER — DEXTROAMPHETAMINE SACCHARATE, AMPHETAMINE ASPARTATE MONOHYDRATE, DEXTROAMPHETAMINE SULFATE AND AMPHETAMINE SULFATE 2.5; 2.5; 2.5; 2.5 MG/1; MG/1; MG/1; MG/1
10 CAPSULE, EXTENDED RELEASE ORAL DAILY
Qty: 30 CAPSULE | Refills: 0 | Status: SHIPPED | OUTPATIENT
Start: 2023-05-20 | End: 2023-06-19

## 2023-04-19 ASSESSMENT — ENCOUNTER SYMPTOMS
FREQUENCY: 0
HEARTBURN: 0
HEMATURIA: 0
FEVER: 0
ABDOMINAL PAIN: 0
SORE THROAT: 0
HEADACHES: 0
NERVOUS/ANXIOUS: 0
PARESTHESIAS: 0
COUGH: 0
DIZZINESS: 0
JOINT SWELLING: 0
SHORTNESS OF BREATH: 0
CHILLS: 0
MYALGIAS: 0
WEAKNESS: 0
EYE PAIN: 0
ARTHRALGIAS: 0
DYSURIA: 0
DIARRHEA: 0
NAUSEA: 0
HEMATOCHEZIA: 0
CONSTIPATION: 0
PALPITATIONS: 0

## 2023-04-19 NOTE — PROGRESS NOTES
SUBJECTIVE:   CC: Max is an 27 year old who presents for preventative health visit.       4/19/2023     3:44 PM   Additional Questions   Roomed by Taylor Denson   Patient has been advised of split billing requirements and indicates understanding: Yes  Healthy Habits:     Getting at least 3 servings of Calcium per day:  Yes    Bi-annual eye exam:  Yes    Dental care twice a year:  NO    Sleep apnea or symptoms of sleep apnea:  Excessive snoring    Diet:  Other    Frequency of exercise:  1 day/week    Duration of exercise:  30-45 minutes    Taking medications regularly:  Yes    Medication side effects:  Other    PHQ-2 Total Score: 0    Additional concerns today:  No          Pt has been taking his ADHD medicine and it is working well for him, denies any side effects, but he feels sometimes it is not working as well as he wanted to, especially when he has to spend long hours infront of the computer.    Today's PHQ-2 Score:       4/12/2023     8:54 AM   PHQ-2 ( 1999 [employer])   Q1: Little interest or pleasure in doing things 0   Q2: Feeling down, depressed or hopeless 0   PHQ-2 Score 0   Q1: Little interest or pleasure in doing things Not at all   Q2: Feeling down, depressed or hopeless Not at all   PHQ-2 Score 0       Have you ever done Advance Care Planning? (For example, a Health Directive, POLST, or a discussion with a medical provider or your loved ones about your wishes): No, advance care planning information given to patient to review.  Patient declined advance care planning discussion at this time.    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 Not Applicable       Last PSA: No results found for: PSA    Reviewed orders with patient. Reviewed health maintenance and updated orders accordingly - Yes  BP Readings from Last 3 Encounters:   04/19/23 (!) 142/86   03/22/23 137/83   11/19/18 132/80    Wt Readings from Last 3 Encounters:   04/19/23 94.3 kg (208 lb)   03/22/23 96.1 kg (211 lb 12.8 oz)   11/19/18 93.4 kg (206 lb)                  Patient Active Problem List   Diagnosis     Allergic rhinitis     Chronic daily headache     Keratosis pilaris     Insomnia     Attention deficit hyperactivity disorder (ADHD), predominantly inattentive type     Past Surgical History:   Procedure Laterality Date     NO HISTORY OF SURGERY         Social History     Tobacco Use     Smoking status: Former     Packs/day: 1.00     Years: 1.00     Pack years: 1.00     Types: Cigarettes     Smokeless tobacco: Never     Tobacco comments:     works at casino, 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 Maternal Grandmother      Cancer Paternal Grandmother         thyroid cancer     Mental Illness Nephew         ADHD     Mental Illness Sister         ADD, Bipolar     Mental Illness Brother         ADHD         Current Outpatient Medications   Medication Sig Dispense Refill     amphetamine-dextroamphetamine (ADDERALL XR) 10 MG 24 hr capsule Take 1 capsule (10 mg) by mouth daily for 30 days 30 capsule 0     [START ON 5/20/2023] amphetamine-dextroamphetamine (ADDERALL XR) 10 MG 24 hr capsule Take 1 capsule (10 mg) by mouth daily for 30 days 30 capsule 0     [START ON 6/20/2023] amphetamine-dextroamphetamine (ADDERALL XR) 10 MG 24 hr capsule Take 1 capsule (10 mg) by mouth daily  "for 30 days 30 capsule 0     amphetamine-dextroamphetamine (ADDERALL XR) 10 MG 24 hr capsule Take 1 capsule (10 mg) by mouth daily 30 capsule 0     No Known Allergies    Reviewed and updated as needed this visit by clinical staff   Tobacco  Allergies  Meds              Reviewed and updated as needed this visit by Provider                 Past Medical History:   Diagnosis Date     Attention deficit hyperactivity disorder (ADHD), predominantly inattentive type 05/31/2017     Chronic daily headache       Past Surgical History:   Procedure Laterality Date     NO HISTORY OF SURGERY         Review of Systems   Constitutional: Negative for chills and fever.   HENT: Negative for congestion, ear pain, hearing loss and sore throat.    Eyes: Negative for pain and visual disturbance.   Respiratory: Negative for cough and shortness of breath.    Cardiovascular: Negative for chest pain, palpitations and peripheral edema.   Gastrointestinal: Negative for abdominal pain, constipation, diarrhea, heartburn, hematochezia and nausea.   Genitourinary: Negative for dysuria, frequency, genital sores, hematuria, impotence, penile discharge and urgency.   Musculoskeletal: Negative for arthralgias, joint swelling and myalgias.   Skin: Negative for rash.   Neurological: Negative for dizziness, weakness, headaches and paresthesias.   Psychiatric/Behavioral: Negative for mood changes. The patient is not nervous/anxious.          OBJECTIVE:   BP (!) 142/86 (BP Location: Right arm, Patient Position: Sitting, Cuff Size: Adult Regular)   Pulse 74   Temp 98.3  F (36.8  C) (Oral)   Resp 14   Ht 1.74 m (5' 8.5\")   Wt 94.3 kg (208 lb)   SpO2 99%   BMI 31.17 kg/m      Physical Exam  GENERAL: healthy, alert and no distress  EYES: Eyes grossly normal to inspection, PERRL and conjunctivae and sclerae normal  HENT: ear canals and TM's normal, nose and mouth without ulcers or lesions  NECK: no adenopathy, no asymmetry, masses, or scars and thyroid " normal to palpation  RESP: lungs clear to auscultation - no rales, rhonchi or wheezes  CV: regular rate and rhythm, normal S1 S2, no S3 or S4, no murmur, click or rub, no peripheral edema and peripheral pulses strong  ABDOMEN: soft, nontender, no hepatosplenomegaly, no masses and bowel sounds normal  MS: no gross musculoskeletal defects noted, no edema  SKIN: no suspicious lesions or rashes  NEURO: Normal strength and tone, mentation intact and speech normal  PSYCH: mentation appears normal, affect normal/bright        ASSESSMENT/PLAN:   (Z00.00) Routine history and physical examination of adult  (primary encounter diagnosis)  Comment: noticed elevated blood pressure, recommend diet, exercise, and recheck BP at home in the next 1 month, pt agreed with plan.  Plan: Lipid panel reflex to direct LDL Fasting            (Z11.4) Screening for HIV (human immunodeficiency virus)  Comment:   Plan: HIV Antigen Antibody Combo            (Z11.59) Need for hepatitis C screening test  Comment:   Plan: Hepatitis C Screen Reflex to HCV RNA Quant and         Genotype            (F90.0) Attention deficit hyperactivity disorder (ADHD), predominantly inattentive type  Comment: well controlled, check urine drug screen, and recheck with me in 6 months (given 3 months supply of adderall today).  Plan: Drug Confirmation Panel Urine with Creat - lab         collect, amphetamine-dextroamphetamine         (ADDERALL XR) 10 MG 24 hr capsule,         amphetamine-dextroamphetamine (ADDERALL XR) 10         MG 24 hr capsule, amphetamine-dextroamphetamine        (ADDERALL XR) 10 MG 24 hr capsule                    COUNSELING:   Reviewed preventive health counseling, as reflected in patient instructions       Regular exercise       Healthy diet/nutrition        He reports that he has quit smoking. His smoking use included cigarettes. He has a 1.00 pack-year smoking history. He has never used smokeless tobacco.        Brenden Piña MD  [facility]  CLINIC [place]

## 2023-04-20 LAB
CHOLEST SERPL-MCNC: 180 MG/DL
CREAT UR-MCNC: 46 MG/DL
HDLC SERPL-MCNC: 31 MG/DL
LDLC SERPL CALC-MCNC: 131 MG/DL
NONHDLC SERPL-MCNC: 149 MG/DL
TRIGL SERPL-MCNC: 88 MG/DL

## 2023-04-21 LAB
HCV AB SERPL QL IA: NONREACTIVE
HIV 1+2 AB+HIV1 P24 AG SERPL QL IA: NONREACTIVE

## 2023-04-24 LAB
AMPHET UR CFM-MCNC: 683 NG/ML
AMPHET/CREAT UR: 1485 NG/MG {CREAT}

## 2023-05-20 ENCOUNTER — TELEPHONE (OUTPATIENT)
Dept: FAMILY MEDICINE | Facility: CLINIC | Age: 28
End: 2023-05-20
Payer: COMMERCIAL

## 2023-05-22 ENCOUNTER — MYC MEDICAL ADVICE (OUTPATIENT)
Dept: FAMILY MEDICINE | Facility: CLINIC | Age: 28
End: 2023-05-22
Payer: COMMERCIAL

## 2023-05-22 VITALS — DIASTOLIC BLOOD PRESSURE: 79 MMHG | SYSTOLIC BLOOD PRESSURE: 136 MMHG

## 2023-05-22 NOTE — TELEPHONE ENCOUNTER
Dr. Piña- see [employer] message below.  He has RX for 5/20/23 and 6/20/23.  Please advise.  Norma Rivera RN

## 2023-05-22 NOTE — TELEPHONE ENCOUNTER
Can we put his BP in the chart please.  I have no idea what's the difference between Adderall generic or brand. Sorry

## 2023-05-22 NOTE — TELEPHONE ENCOUNTER
Home blood pressure readings/records provided by patient updated in chart per Dr Piña.  rBandi Zamora CMA ([facility])

## 2023-06-26 ENCOUNTER — MYC MEDICAL ADVICE (OUTPATIENT)
Dept: FAMILY MEDICINE | Facility: CLINIC | Age: 28
End: 2023-06-26
Payer: COMMERCIAL

## 2023-06-26 DIAGNOSIS — F90.0 ATTENTION DEFICIT HYPERACTIVITY DISORDER (ADHD), PREDOMINANTLY INATTENTIVE TYPE: ICD-10-CM

## 2023-06-26 RX ORDER — DEXTROAMPHETAMINE SACCHARATE, AMPHETAMINE ASPARTATE MONOHYDRATE, DEXTROAMPHETAMINE SULFATE AND AMPHETAMINE SULFATE 2.5; 2.5; 2.5; 2.5 MG/1; MG/1; MG/1; MG/1
10 CAPSULE, EXTENDED RELEASE ORAL DAILY
Qty: 30 CAPSULE | Refills: 0 | Status: SHIPPED | OUTPATIENT
Start: 2023-06-26 | End: 2023-07-24

## 2023-06-26 NOTE — TELEPHONE ENCOUNTER
Dr. Piña   Please resend patient's rx of adderall xr to our [facility] clinic pharmacy as dee does not have in stock - see my chart     Naomi Lanza, Registered Nurse  [facility]

## 2023-07-24 ENCOUNTER — MYC MEDICAL ADVICE (OUTPATIENT)
Dept: FAMILY MEDICINE | Facility: CLINIC | Age: 28
End: 2023-07-24
Payer: COMMERCIAL

## 2023-07-24 DIAGNOSIS — F90.0 ATTENTION DEFICIT HYPERACTIVITY DISORDER (ADHD), PREDOMINANTLY INATTENTIVE TYPE: ICD-10-CM

## 2023-07-24 RX ORDER — DEXTROAMPHETAMINE SACCHARATE, AMPHETAMINE ASPARTATE MONOHYDRATE, DEXTROAMPHETAMINE SULFATE AND AMPHETAMINE SULFATE 3.75; 3.75; 3.75; 3.75 MG/1; MG/1; MG/1; MG/1
15 CAPSULE, EXTENDED RELEASE ORAL DAILY
Qty: 30 CAPSULE | Refills: 0 | Status: SHIPPED | OUTPATIENT
Start: 2023-07-24 | End: 2023-07-26

## 2023-07-24 NOTE — TELEPHONE ENCOUNTER
See [employer] message, routed to AA, pt wants med increase, please confirm follow up plan, inform pt, do you want E visit?    Jhoana Lynch RN, BSN  [facility]

## 2023-07-26 RX ORDER — DEXTROAMPHETAMINE SACCHARATE, AMPHETAMINE ASPARTATE MONOHYDRATE, DEXTROAMPHETAMINE SULFATE AND AMPHETAMINE SULFATE 3.75; 3.75; 3.75; 3.75 MG/1; MG/1; MG/1; MG/1
15 CAPSULE, EXTENDED RELEASE ORAL DAILY
Qty: 30 CAPSULE | Refills: 0 | Status: SHIPPED | OUTPATIENT
Start: 2023-07-26 | End: 2023-08-24

## 2023-07-26 NOTE — TELEPHONE ENCOUNTER
Dr. Piña   Please see my chart, pharmacy that adderall was sent to does not have in stock, asking to resend to the  [facility] pharmacy, RN pended     Thank you   Naomi Lanza, Registered Nurse  [facility]

## 2023-08-24 ENCOUNTER — MYC REFILL (OUTPATIENT)
Dept: FAMILY MEDICINE | Facility: CLINIC | Age: 28
End: 2023-08-24
Payer: COMMERCIAL

## 2023-08-24 DIAGNOSIS — F90.0 ATTENTION DEFICIT HYPERACTIVITY DISORDER (ADHD), PREDOMINANTLY INATTENTIVE TYPE: ICD-10-CM

## 2023-08-25 RX ORDER — DEXTROAMPHETAMINE SACCHARATE, AMPHETAMINE ASPARTATE MONOHYDRATE, DEXTROAMPHETAMINE SULFATE AND AMPHETAMINE SULFATE 3.75; 3.75; 3.75; 3.75 MG/1; MG/1; MG/1; MG/1
15 CAPSULE, EXTENDED RELEASE ORAL DAILY
Qty: 30 CAPSULE | Refills: 0 | Status: SHIPPED | OUTPATIENT
Start: 2023-08-25

## 2023-08-25 RX ORDER — DEXTROAMPHETAMINE SACCHARATE, AMPHETAMINE ASPARTATE MONOHYDRATE, DEXTROAMPHETAMINE SULFATE AND AMPHETAMINE SULFATE 3.75; 3.75; 3.75; 3.75 MG/1; MG/1; MG/1; MG/1
15 CAPSULE, EXTENDED RELEASE ORAL DAILY
Qty: 30 CAPSULE | Refills: 0 | Status: SHIPPED | OUTPATIENT
Start: 2023-09-26 | End: 2023-10-26

## 2023-08-25 NOTE — TELEPHONE ENCOUNTER
Please call, pt needs to be seen in October, virtual or in person.  Brenden Piña MD  [facility]  [phone]

## 2023-08-29 NOTE — TELEPHONE ENCOUNTER
RN called and spoke with pt  -Informed of Adderall refill sent   -Scheduled pt for OV with Dr. Piña 10/9/23 at 8:40am    Patient was given an opportunity to ask questions, verbalized understanding of plan, and is agreeable.    Patsy MONTOYA RN  Patient Advocate Liaison - PAL RN  [facility]  [phone]

## 2023-10-09 ENCOUNTER — OFFICE VISIT (OUTPATIENT)
Dept: FAMILY MEDICINE | Facility: CLINIC | Age: 28
End: 2023-10-09
Payer: COMMERCIAL

## 2023-10-09 VITALS
OXYGEN SATURATION: 99 % | HEIGHT: 68 IN | RESPIRATION RATE: 17 BRPM | TEMPERATURE: 98.4 F | DIASTOLIC BLOOD PRESSURE: 78 MMHG | SYSTOLIC BLOOD PRESSURE: 138 MMHG | WEIGHT: 209.2 LBS | BODY MASS INDEX: 31.71 KG/M2 | HEART RATE: 85 BPM

## 2023-10-09 DIAGNOSIS — F90.0 ATTENTION DEFICIT HYPERACTIVITY DISORDER (ADHD), PREDOMINANTLY INATTENTIVE TYPE: Primary | ICD-10-CM

## 2023-10-09 PROCEDURE — 90480 ADMN SARSCOV2 VAC 1/ONLY CMP: CPT | Performed by: FAMILY MEDICINE

## 2023-10-09 PROCEDURE — 90686 IIV4 VACC NO PRSV 0.5 ML IM: CPT | Performed by: FAMILY MEDICINE

## 2023-10-09 PROCEDURE — 90471 IMMUNIZATION ADMIN: CPT | Performed by: FAMILY MEDICINE

## 2023-10-09 PROCEDURE — 99213 OFFICE O/P EST LOW 20 MIN: CPT | Mod: 25 | Performed by: FAMILY MEDICINE

## 2023-10-09 PROCEDURE — 91320 SARSCV2 VAC 30MCG TRS-SUC IM: CPT | Performed by: FAMILY MEDICINE

## 2023-10-09 RX ORDER — DEXTROAMPHETAMINE SACCHARATE, AMPHETAMINE ASPARTATE MONOHYDRATE, DEXTROAMPHETAMINE SULFATE AND AMPHETAMINE SULFATE 3.75; 3.75; 3.75; 3.75 MG/1; MG/1; MG/1; MG/1
15 CAPSULE, EXTENDED RELEASE ORAL DAILY
Qty: 30 CAPSULE | Refills: 0 | Status: SHIPPED | OUTPATIENT
Start: 2024-01-23 | End: 2024-02-22

## 2023-10-09 RX ORDER — DEXTROAMPHETAMINE SACCHARATE, AMPHETAMINE ASPARTATE MONOHYDRATE, DEXTROAMPHETAMINE SULFATE AND AMPHETAMINE SULFATE 3.75; 3.75; 3.75; 3.75 MG/1; MG/1; MG/1; MG/1
15 CAPSULE, EXTENDED RELEASE ORAL DAILY
Qty: 30 CAPSULE | Refills: 0 | Status: SHIPPED | OUTPATIENT
Start: 2023-11-24 | End: 2023-12-24

## 2023-10-09 RX ORDER — DEXTROAMPHETAMINE SACCHARATE, AMPHETAMINE ASPARTATE MONOHYDRATE, DEXTROAMPHETAMINE SULFATE AND AMPHETAMINE SULFATE 3.75; 3.75; 3.75; 3.75 MG/1; MG/1; MG/1; MG/1
15 CAPSULE, EXTENDED RELEASE ORAL DAILY
Qty: 30 CAPSULE | Refills: 0 | Status: SHIPPED | OUTPATIENT
Start: 2024-03-22 | End: 2024-04-21

## 2023-10-09 RX ORDER — DEXTROAMPHETAMINE SACCHARATE, AMPHETAMINE ASPARTATE MONOHYDRATE, DEXTROAMPHETAMINE SULFATE AND AMPHETAMINE SULFATE 3.75; 3.75; 3.75; 3.75 MG/1; MG/1; MG/1; MG/1
15 CAPSULE, EXTENDED RELEASE ORAL DAILY
Qty: 30 CAPSULE | Refills: 0 | Status: SHIPPED | OUTPATIENT
Start: 2024-02-22 | End: 2024-03-27

## 2023-10-09 RX ORDER — DEXTROAMPHETAMINE SACCHARATE, AMPHETAMINE ASPARTATE MONOHYDRATE, DEXTROAMPHETAMINE SULFATE AND AMPHETAMINE SULFATE 3.75; 3.75; 3.75; 3.75 MG/1; MG/1; MG/1; MG/1
15 CAPSULE, EXTENDED RELEASE ORAL DAILY
Qty: 30 CAPSULE | Refills: 0 | Status: SHIPPED | OUTPATIENT
Start: 2023-12-24 | End: 2024-01-23

## 2023-10-09 RX ORDER — DEXTROAMPHETAMINE SACCHARATE, AMPHETAMINE ASPARTATE MONOHYDRATE, DEXTROAMPHETAMINE SULFATE AND AMPHETAMINE SULFATE 3.75; 3.75; 3.75; 3.75 MG/1; MG/1; MG/1; MG/1
15 CAPSULE, EXTENDED RELEASE ORAL DAILY
Qty: 30 CAPSULE | Refills: 0 | Status: SHIPPED | OUTPATIENT
Start: 2023-10-24 | End: 2023-11-23

## 2023-10-09 NOTE — PROGRESS NOTES
"  Assessment & Plan     Attention deficit hyperactivity disorder (ADHD), predominantly inattentive type  [place] Prescription Monitoring Report: reviewed and consistent with self report with no concerns for abuse or misuse.  Given 6 months supply.  - amphetamine-dextroamphetamine (ADDERALL XR) 15 MG 24 hr capsule; Take 1 capsule (15 mg) by mouth daily for 30 days  - amphetamine-dextroamphetamine (ADDERALL XR) 15 MG 24 hr capsule; Take 1 capsule (15 mg) by mouth daily for 30 days  - amphetamine-dextroamphetamine (ADDERALL XR) 15 MG 24 hr capsule; Take 1 capsule (15 mg) by mouth daily for 30 days  - amphetamine-dextroamphetamine (ADDERALL XR) 15 MG 24 hr capsule; Take 1 capsule (15 mg) by mouth daily for 30 days  - amphetamine-dextroamphetamine (ADDERALL XR) 15 MG 24 hr capsule; Take 1 capsule (15 mg) by mouth daily for 30 days  - amphetamine-dextroamphetamine (ADDERALL XR) 15 MG 24 hr capsule; Take 1 capsule (15 mg) by mouth daily for 30 days             BMI:   Estimated body mass index is 31.81 kg/m  as calculated from the following:    Height as of this encounter: 1.727 m (5' 8\").    Weight as of this encounter: 94.9 kg (209 lb 3.2 oz).   Weight management plan: Discussed healthy diet and exercise guidelines    Return for physical in 6 months.    rBenden Piña MD  [facility] GLENNY Santana is a 28 year old, presenting for the following health issues:  A.D.H.D        10/9/2023     8:41 AM   Additional Questions   Roomed by Taylor Denson       History of Present Illness       Reason for visit:  ADHD    He eats 0-1 servings of fruits and vegetables daily.He consumes 0 sweetened beverage(s) daily.He exercises with enough effort to increase his heart rate 30 to 60 minutes per day.  He exercises with enough effort to increase his heart rate 4 days per week.   He is taking medications regularly.       ADHD Follow-Up (Adult)  Concerns: none  Changes since last visit: Stable  Taking " "controlled (daily) medications as prescribed: Yes  Sleep: no problems  Adult ADHD Self-Reporting form given to patient?:  No  Currently in counseling: No    Medication Benefits:   Controlled symptoms: focus and gives more motivation, stay on task and feel better  Uncontrolled symptoms:  on occasion its hard to focus on the right things    Medication Side Effects:  Reports:  none  Sleep Problems? no  ++++++++++++++++++++++++++++++++++++++++++++++++    Employer Concerns/Feedback: Stable  Coworker Concerns:   Stable  Home/Family Concerns: Stable              Review of Systems         Objective    BP (!) 140/78 (BP Location: Right arm, Patient Position: Sitting, Cuff Size: Adult Regular)   Pulse 85   Temp 98.4  F (36.9  C) (Oral)   Resp 17   Ht 1.727 m (5' 8\")   Wt 94.9 kg (209 lb 3.2 oz)   SpO2 99%   BMI 31.81 kg/m    Body mass index is 31.81 kg/m .  Physical Exam   GENERAL: healthy, alert and no distress  RESP: lungs clear to auscultation - no rales, rhonchi or wheezes  CV: regular rate and rhythm, normal S1 S2, no S3 or S4, no murmur, click or rub, no peripheral edema and peripheral pulses strong                      "

## 2023-11-28 ENCOUNTER — E-VISIT (OUTPATIENT)
Dept: FAMILY MEDICINE | Facility: CLINIC | Age: 28
End: 2023-11-28
Payer: COMMERCIAL

## 2023-11-28 ENCOUNTER — TELEPHONE (OUTPATIENT)
Dept: FAMILY MEDICINE | Facility: CLINIC | Age: 28
End: 2023-11-28

## 2023-11-28 DIAGNOSIS — Z20.822 SUSPECTED COVID-19 VIRUS INFECTION: Primary | ICD-10-CM

## 2023-11-28 PROCEDURE — 99421 OL DIG E/M SVC 5-10 MIN: CPT | Performed by: FAMILY MEDICINE

## 2023-11-28 NOTE — PATIENT INSTRUCTIONS
"Dear Max,      Based on your responses, you have COVID-19.   I sent a prescription for Paxlovid to take twice daily for 5 days, and it should be ok to take it with your adderall, most common side affects of the medicine is diarrhea and metallic taste in the mouth.  I recommend that you start on the medicine as soon as possible to get better results.  Below are some helpful informations.  Brenden Piña MD  [facility].   [phone]      How do I self-isolate?  You isolate when you have symptoms of COVID or a test shows you have COVID, even if you don t have symptoms.   If you DO have symptoms:  Stay home and away from others  For at least 5 days after your symptoms started, AND   You are fever free for 24 hours (with no medicine that reduces fever), AND  Your other symptoms are better.  Wear a mask for 10 full days any time you are around others.  If you DON T have symptoms:  Stay at home and away from others for at least 5 days after your positive test.  Wear a mask for 10 full days any time you are around others.    How can I take care of myself?  Over the counter medications may help with your symptoms such as runny or stuffy nose, cough, chills, or fever.  Talk to your care team about your options.     Some people are at high risk of severe illness (for example, you have a weak immune system, you re 50 years or older, or you have certain medical problems). If your risk is high and your symptoms started in the last 5 days, we strongly recommend for you to get COVID treatment as soon as possible.There are safe and effective medicines available that can make you feel better faster, and prevent hospitalization and death.       To discuss COVID treatment you can:  Call your clinic OR [phone] ([phone]) and ask to speak to a nurse about a positive COVID test.  Send a [employer] message to your care team. In [employer], select \"Ask a Medical Question\" then \"Do I need an appointment\" " "and put \"COVID\" in the subject line. Please include a phone number to call you back in the message.             Get lots of rest. Drink extra fluids (unless a doctor has told you not to)  Take Tylenol (acetaminophen) or ibuprofen for fever or pain. If you have liver or kidney problems, ask your family doctor if it's okay to take Tylenol or ibuprofen  Take over the counter medications for your symptoms, as directed by your doctor. You may also talk to your pharmacist.    If you have other health problems (like cancer, heart failure, an organ transplant or severe kidney disease): Call your specialty clinic if you don't feel better in the next 2 days.  Know when to call 911. Emergency warning signs include:  Trouble breathing or shortness of breath  Pain or pressure in the chest that doesn't go away  Feeling confused like you haven't felt before, or not being able to wake up  Bluish-colored lips or face    Where can I get more information?  [facility] - About COVID-19: [email or web address]   CDC - What to Do If You're Sick: https://www.cdc.gov/coronavirus/2019-ncov/if-you-are-sick/index.html   CDC -  Isolation https://www.cdc.gov/coronavirus/2019-ncov/your-health/isolation.html    " Break RN note- Patient resting quietly in bed, breathing even and nonlabored. No acute distress. No complaints at this time. Safety maintained. Patient stable upon exiting the room.

## 2024-01-17 ENCOUNTER — E-VISIT (OUTPATIENT)
Dept: FAMILY MEDICINE | Facility: CLINIC | Age: 29
End: 2024-01-17
Payer: COMMERCIAL

## 2024-01-17 DIAGNOSIS — F33.1 MAJOR DEPRESSIVE DISORDER, RECURRENT EPISODE, MODERATE (H): Primary | ICD-10-CM

## 2024-01-17 PROCEDURE — 99207 PR NON-BILLABLE SERV PER CHARTING: CPT | Performed by: FAMILY MEDICINE

## 2024-01-17 ASSESSMENT — PATIENT HEALTH QUESTIONNAIRE - PHQ9
10. IF YOU CHECKED OFF ANY PROBLEMS, HOW DIFFICULT HAVE THESE PROBLEMS MADE IT FOR YOU TO DO YOUR WORK, TAKE CARE OF THINGS AT HOME, OR GET ALONG WITH OTHER PEOPLE: SOMEWHAT DIFFICULT
SUM OF ALL RESPONSES TO PHQ QUESTIONS 1-9: 20
SUM OF ALL RESPONSES TO PHQ QUESTIONS 1-9: 20

## 2024-01-17 NOTE — PATIENT INSTRUCTIONS
Thank you for choosing us for your care. I think an in-clinic visit would be best next steps based on your symptoms. Please schedule a clinic appointment; you won t be charged for this eVisit.      You can schedule an appointment right here in [facility], or call [phone]

## 2024-01-18 ENCOUNTER — OFFICE VISIT (OUTPATIENT)
Dept: FAMILY MEDICINE | Facility: CLINIC | Age: 29
End: 2024-01-18
Payer: COMMERCIAL

## 2024-01-18 VITALS
WEIGHT: 217.2 LBS | OXYGEN SATURATION: 97 % | DIASTOLIC BLOOD PRESSURE: 70 MMHG | HEIGHT: 69 IN | SYSTOLIC BLOOD PRESSURE: 130 MMHG | HEART RATE: 100 BPM | TEMPERATURE: 98.5 F | RESPIRATION RATE: 16 BRPM | BODY MASS INDEX: 32.17 KG/M2

## 2024-01-18 DIAGNOSIS — F33.8 SEASONAL AFFECTIVE DISORDER (H): Primary | ICD-10-CM

## 2024-01-18 PROCEDURE — 99214 OFFICE O/P EST MOD 30 MIN: CPT | Performed by: FAMILY MEDICINE

## 2024-01-18 ASSESSMENT — PATIENT HEALTH QUESTIONNAIRE - PHQ9
SUM OF ALL RESPONSES TO PHQ QUESTIONS 1-9: 20
SUM OF ALL RESPONSES TO PHQ QUESTIONS 1-9: 19
10. IF YOU CHECKED OFF ANY PROBLEMS, HOW DIFFICULT HAVE THESE PROBLEMS MADE IT FOR YOU TO DO YOUR WORK, TAKE CARE OF THINGS AT HOME, OR GET ALONG WITH OTHER PEOPLE: SOMEWHAT DIFFICULT
SUM OF ALL RESPONSES TO PHQ QUESTIONS 1-9: 19

## 2024-01-18 ASSESSMENT — PAIN SCALES - GENERAL: PAINLEVEL: NO PAIN (0)

## 2024-01-18 NOTE — PROGRESS NOTES
Assessment & Plan     Seasonal affective disorder (H24)  Discussed options for treatment, will start on   - FLUoxetine (PROZAC) 20 MG capsule; Take 1 capsule (20 mg) by mouth daily  Recheck in 1 month, Medications side effects and risks was discussed with patient in details, including suicide risks, and advised to call if any side effects, pt agreed to take the medications, and with the treatment plan.  .              Depression Screening Follow Up        1/18/2024     9:13 AM   PHQ   PHQ-9 Total Score 19   Q9: Thoughts of better off dead/self-harm past 2 weeks Not at all         Follow Up Actions Taken  Crisis resource information provided in After Visit Summary         Subjective   Max is a 28 year old, presenting for the following health issues:  MH Follow Up (Seasonal affective disorder)        1/18/2024     9:17 AM   Additional Questions   Roomed by Lindsey Morgan   Accompanied by self     History of Present Illness       Mental Health Follow-up:  Patient presents to follow-up on Depression.Patient's depression since last visit has been:  No change  The patient is not having other symptoms associated with depression.      Any significant life events: No  Patient is not feeling anxious or having panic attacks.  Patient has no concerns about alcohol or drug use.    He eats 0-1 servings of fruits and vegetables daily.He consumes 1 sweetened beverage(s) daily.He exercises with enough effort to increase his heart rate 30 to 60 minutes per day.  He exercises with enough effort to increase his heart rate 5 days per week.   He is taking medications regularly.       Pt has been having less energy recently, started at the beginning of winter time, he feels his thinking is a littler slower, also in general he feels more sleepy in the winter, pt is not feeling happy, over thinking, spending more time on the phone, trying to pass time, he is over eating, this happens after winter.  He tried light therapy, but not consistently.  "          Objective    /70 (BP Location: Left arm, Patient Position: Sitting, Cuff Size: Adult Large)   Pulse 100   Temp 98.5  F (36.9  C) (Oral)   Resp 16   Ht 1.74 m (5' 8.5\")   Wt 98.5 kg (217 lb 3.2 oz)   SpO2 97%   BMI 32.54 kg/m    Body mass index is 32.54 kg/m .    Physical Exam   GENERAL: alert and no distress  PSYCH: mentation appears normal, affect normal/bright            Signed Electronically by: Brenden Piña MD    "

## 2024-03-27 DIAGNOSIS — F90.0 ATTENTION DEFICIT HYPERACTIVITY DISORDER (ADHD), PREDOMINANTLY INATTENTIVE TYPE: ICD-10-CM

## 2024-03-27 RX ORDER — DEXTROAMPHETAMINE SACCHARATE, AMPHETAMINE ASPARTATE MONOHYDRATE, DEXTROAMPHETAMINE SULFATE AND AMPHETAMINE SULFATE 3.75; 3.75; 3.75; 3.75 MG/1; MG/1; MG/1; MG/1
15 CAPSULE, EXTENDED RELEASE ORAL DAILY
Qty: 30 CAPSULE | Refills: 0 | Status: SHIPPED | OUTPATIENT
Start: 2024-03-27 | End: 2024-04-26

## 2024-04-25 ENCOUNTER — MYC REFILL (OUTPATIENT)
Dept: FAMILY MEDICINE | Facility: CLINIC | Age: 29
End: 2024-04-25
Payer: COMMERCIAL

## 2024-04-25 DIAGNOSIS — F90.0 ATTENTION DEFICIT HYPERACTIVITY DISORDER (ADHD), PREDOMINANTLY INATTENTIVE TYPE: ICD-10-CM

## 2024-04-25 RX ORDER — DEXTROAMPHETAMINE SACCHARATE, AMPHETAMINE ASPARTATE MONOHYDRATE, DEXTROAMPHETAMINE SULFATE AND AMPHETAMINE SULFATE 3.75; 3.75; 3.75; 3.75 MG/1; MG/1; MG/1; MG/1
15 CAPSULE, EXTENDED RELEASE ORAL DAILY
Qty: 30 CAPSULE | Refills: 0 | Status: CANCELLED | OUTPATIENT
Start: 2024-04-25

## 2024-04-26 RX ORDER — DEXTROAMPHETAMINE SACCHARATE, AMPHETAMINE ASPARTATE MONOHYDRATE, DEXTROAMPHETAMINE SULFATE AND AMPHETAMINE SULFATE 3.75; 3.75; 3.75; 3.75 MG/1; MG/1; MG/1; MG/1
15 CAPSULE, EXTENDED RELEASE ORAL DAILY
Qty: 30 CAPSULE | Refills: 0 | Status: SHIPPED | OUTPATIENT
Start: 2024-06-27 | End: 2024-07-27

## 2024-04-26 RX ORDER — DEXTROAMPHETAMINE SACCHARATE, AMPHETAMINE ASPARTATE MONOHYDRATE, DEXTROAMPHETAMINE SULFATE AND AMPHETAMINE SULFATE 3.75; 3.75; 3.75; 3.75 MG/1; MG/1; MG/1; MG/1
15 CAPSULE, EXTENDED RELEASE ORAL DAILY
Qty: 30 CAPSULE | Refills: 0 | Status: SHIPPED | OUTPATIENT
Start: 2024-05-27 | End: 2024-06-26

## 2024-04-26 RX ORDER — DEXTROAMPHETAMINE SACCHARATE, AMPHETAMINE ASPARTATE MONOHYDRATE, DEXTROAMPHETAMINE SULFATE AND AMPHETAMINE SULFATE 3.75; 3.75; 3.75; 3.75 MG/1; MG/1; MG/1; MG/1
15 CAPSULE, EXTENDED RELEASE ORAL DAILY
Qty: 30 CAPSULE | Refills: 0 | Status: SHIPPED | OUTPATIENT
Start: 2024-04-26 | End: 2024-05-26

## 2024-05-23 ENCOUNTER — OFFICE VISIT (OUTPATIENT)
Dept: FAMILY MEDICINE | Facility: CLINIC | Age: 29
End: 2024-05-23
Attending: FAMILY MEDICINE
Payer: COMMERCIAL

## 2024-05-23 VITALS
OXYGEN SATURATION: 98 % | HEIGHT: 69 IN | SYSTOLIC BLOOD PRESSURE: 137 MMHG | WEIGHT: 230.2 LBS | RESPIRATION RATE: 24 BRPM | DIASTOLIC BLOOD PRESSURE: 79 MMHG | BODY MASS INDEX: 34.1 KG/M2 | HEART RATE: 80 BPM | TEMPERATURE: 98.3 F

## 2024-05-23 DIAGNOSIS — F90.0 ATTENTION DEFICIT HYPERACTIVITY DISORDER (ADHD), PREDOMINANTLY INATTENTIVE TYPE: ICD-10-CM

## 2024-05-23 DIAGNOSIS — Z00.00 ROUTINE GENERAL MEDICAL EXAMINATION AT A HEALTH CARE FACILITY: Primary | ICD-10-CM

## 2024-05-23 DIAGNOSIS — F33.8 SEASONAL AFFECTIVE DISORDER (H): ICD-10-CM

## 2024-05-23 LAB — T PALLIDUM AB SER QL: NONREACTIVE

## 2024-05-23 PROCEDURE — 87389 HIV-1 AG W/HIV-1&-2 AB AG IA: CPT | Performed by: FAMILY MEDICINE

## 2024-05-23 PROCEDURE — 86780 TREPONEMA PALLIDUM: CPT | Performed by: FAMILY MEDICINE

## 2024-05-23 PROCEDURE — 99395 PREV VISIT EST AGE 18-39: CPT | Performed by: FAMILY MEDICINE

## 2024-05-23 PROCEDURE — 87591 N.GONORRHOEAE DNA AMP PROB: CPT | Performed by: FAMILY MEDICINE

## 2024-05-23 PROCEDURE — 36415 COLL VENOUS BLD VENIPUNCTURE: CPT | Performed by: FAMILY MEDICINE

## 2024-05-23 PROCEDURE — 87491 CHLMYD TRACH DNA AMP PROBE: CPT | Performed by: FAMILY MEDICINE

## 2024-05-23 PROCEDURE — 99213 OFFICE O/P EST LOW 20 MIN: CPT | Mod: 25 | Performed by: FAMILY MEDICINE

## 2024-05-23 PROCEDURE — G0481 DRUG TEST DEF 8-14 CLASSES: HCPCS | Performed by: FAMILY MEDICINE

## 2024-05-23 PROCEDURE — 82947 ASSAY GLUCOSE BLOOD QUANT: CPT | Performed by: FAMILY MEDICINE

## 2024-05-23 SDOH — HEALTH STABILITY: PHYSICAL HEALTH: ON AVERAGE, HOW MANY DAYS PER WEEK DO YOU ENGAGE IN MODERATE TO STRENUOUS EXERCISE (LIKE A BRISK WALK)?: 1 DAY

## 2024-05-23 SDOH — HEALTH STABILITY: PHYSICAL HEALTH: ON AVERAGE, HOW MANY MINUTES DO YOU ENGAGE IN EXERCISE AT THIS LEVEL?: 30 MIN

## 2024-05-23 ASSESSMENT — PATIENT HEALTH QUESTIONNAIRE - PHQ9
10. IF YOU CHECKED OFF ANY PROBLEMS, HOW DIFFICULT HAVE THESE PROBLEMS MADE IT FOR YOU TO DO YOUR WORK, TAKE CARE OF THINGS AT HOME, OR GET ALONG WITH OTHER PEOPLE: SOMEWHAT DIFFICULT
SUM OF ALL RESPONSES TO PHQ QUESTIONS 1-9: 1
SUM OF ALL RESPONSES TO PHQ QUESTIONS 1-9: 1

## 2024-05-23 ASSESSMENT — LIFESTYLE VARIABLES
SKIP TO QUESTIONS 9-10: 1
HOW OFTEN DO YOU HAVE A DRINK CONTAINING ALCOHOL: NEVER
HOW MANY STANDARD DRINKS CONTAINING ALCOHOL DO YOU HAVE ON A TYPICAL DAY: PATIENT DOES NOT DRINK
HOW OFTEN DO YOU HAVE SIX OR MORE DRINKS ON ONE OCCASION: NEVER
AUDIT-C TOTAL SCORE: 0

## 2024-05-23 ASSESSMENT — ANXIETY QUESTIONNAIRES
8. IF YOU CHECKED OFF ANY PROBLEMS, HOW DIFFICULT HAVE THESE MADE IT FOR YOU TO DO YOUR WORK, TAKE CARE OF THINGS AT HOME, OR GET ALONG WITH OTHER PEOPLE?: NOT DIFFICULT AT ALL
7. FEELING AFRAID AS IF SOMETHING AWFUL MIGHT HAPPEN: NOT AT ALL
6. BECOMING EASILY ANNOYED OR IRRITABLE: NOT AT ALL
5. BEING SO RESTLESS THAT IT IS HARD TO SIT STILL: NOT AT ALL
2. NOT BEING ABLE TO STOP OR CONTROL WORRYING: NOT AT ALL
GAD7 TOTAL SCORE: 1
IF YOU CHECKED OFF ANY PROBLEMS ON THIS QUESTIONNAIRE, HOW DIFFICULT HAVE THESE PROBLEMS MADE IT FOR YOU TO DO YOUR WORK, TAKE CARE OF THINGS AT HOME, OR GET ALONG WITH OTHER PEOPLE: NOT DIFFICULT AT ALL
3. WORRYING TOO MUCH ABOUT DIFFERENT THINGS: NOT AT ALL
1. FEELING NERVOUS, ANXIOUS, OR ON EDGE: NOT AT ALL
7. FEELING AFRAID AS IF SOMETHING AWFUL MIGHT HAPPEN: NOT AT ALL
GAD7 TOTAL SCORE: 1
4. TROUBLE RELAXING: SEVERAL DAYS
GAD7 TOTAL SCORE: 1

## 2024-05-23 ASSESSMENT — SOCIAL DETERMINANTS OF HEALTH (SDOH)
ARE YOU MARRIED, WIDOWED, DIVORCED, SEPARATED, NEVER MARRIED, OR LIVING WITH A PARTNER?: NEVER MARRIED
HOW OFTEN DO YOU GET TOGETHER WITH FRIENDS OR RELATIVES?: ONCE A WEEK
IN A TYPICAL WEEK, HOW MANY TIMES DO YOU TALK ON THE PHONE WITH FAMILY, FRIENDS, OR NEIGHBORS?: ONCE A WEEK
DO YOU BELONG TO ANY CLUBS OR ORGANIZATIONS SUCH AS CHURCH GROUPS UNIONS, FRATERNAL OR ATHLETIC GROUPS, OR SCHOOL GROUPS?: NO
HOW OFTEN DO YOU ATTEND CHURCH OR RELIGIOUS SERVICES?: NEVER
HOW OFTEN DO YOU ATTENT MEETINGS OF THE CLUB OR ORGANIZATION YOU BELONG TO?: NEVER
HOW OFTEN DO YOU GET TOGETHER WITH FRIENDS OR RELATIVES?: ONCE A WEEK

## 2024-05-23 NOTE — ASSESSMENT & PLAN NOTE
Well controlled on adderall 15 mg daily, pt is thinking of moving to [place], and will continue on prescribing medicine until he is able to establish with a new provider.  Meanwhile, check urine drug screen today.  Pt will contact me when he will be moving to [place], and will print 3 to 4 months prescriptions for medications.

## 2024-05-23 NOTE — PROGRESS NOTES
"Preventive Care Visit  [facility]  Brenden Piña MD, Family Medicine  May 23, 2024      Assessment & Plan   Problem List Items Addressed This Visit       Attention deficit hyperactivity disorder (ADHD), predominantly inattentive type     Well controlled on adderall 15 mg daily, pt is thinking of moving to [place], and will continue on prescribing medicine until he is able to establish with a new provider.  Meanwhile, check urine drug screen today.  Pt will contact me when he will be moving to [place], and will print 3 to 4 months prescriptions for medications.         Relevant Orders    Drug Confirmation Panel Urine with Creat - lab collect    Seasonal affective disorder (H24)     Pt started on Fluoxetine and felt much better, then he stopped it as it is spring/summer.          Other Visit Diagnoses       Routine general medical examination at a health care facility    -  Primary  Discussed weight loss and exercise.   Also will check STD (pt had a new partner last year).    Relevant Orders    Glucose    HIV Antigen Antibody Combo    Treponema Abs w Reflex to RPR and Titer    NEISSERIA GONORRHOEA PCR    CHLAMYDIA TRACHOMATIS PCR                    BMI  Estimated body mass index is 33.99 kg/m  as calculated from the following:    Height as of this encounter: 1.753 m (5' 9\").    Weight as of this encounter: 104.4 kg (230 lb 3.2 oz).   Weight management plan: Discussed healthy diet and exercise guidelines    Counseling  Appropriate preventive services were discussed with this patient, including applicable screening as appropriate for fall prevention, nutrition, physical activity, Tobacco-use cessation, weight loss and cognition.  Checklist reviewing preventive services available has been given to the patient.  Reviewed patient's diet, addressing concerns and/or questions.   He is at risk for lack of exercise and has been provided with information to increase physical activity for the benefit of " his well-being.   The patient was instructed to see the dentist every 6 months.   He is at risk for psychosocial distress and has been provided with information to reduce risk.           Rene Santana is a 28 year old, presenting for the following:  Physical and A.D.H.D (6 month's routine check)        5/23/2024     8:31 AM   Additional Questions   Roomed by Brandi   Accompanied by Self        Health Care Directive  Patient does not have a Health Care Directive or Living Will:     A.D.H.D          ADHD Follow-Up (Adult)  Concerns: none.  Changes since last visit: Stable  Taking controlled (daily) medications as prescribed: Yes  Sleep: no problems  Adult ADHD Self-Reporting form given to patient?: No.  Currently in counseling: No    Medication Benefits:   Controlled symptoms: Attention span, Distractability, and Finishing tasks  Uncontrolled symptoms:  None    Medication Side Effects:  Reports:  none  Sleep Problems? no  ++++++++++++++++++++++++++++++++++++++++++++++++              5/23/2024   General Health   How would you rate your overall physical health? Good   Feel stress (tense, anxious, or unable to sleep) Only a little    Only a little   (!) STRESS CONCERN      5/23/2024   Nutrition   Three or more servings of calcium each day? (!) NO   Diet: Breakfast skipped   How many servings of fruit and vegetables per day? (!) 0-1   How many sweetened beverages each day? 0-1         5/23/2024   Exercise   Days per week of moderate/strenous exercise 1 day    1 day   Average minutes spent exercising at this level 30 min    30 min   (!) EXERCISE CONCERN      5/23/2024   Social Factors   Frequency of gathering with friends or relatives Once a week    Once a week   Worry food won't last until get money to buy more No    No   Food not last or not have enough money for food? No    No   Do you have housing?  Yes    Yes   Are you worried about losing your housing? No    No   Lack of transportation? No    No   Unable to get  "utilities (heat,electricity)? No    No         5/23/2024   Dental   Dentist two times every year? (!) NO         5/23/2024   TB Screening   Were you born outside of the [place]? No       Today's PHQ-9 Score:       5/23/2024     7:41 AM   PHQ-9 SCORE   PHQ-9 Total Score MyChart 1 (Minimal depression)   PHQ-9 Total Score 1         5/23/2024   Substance Use   Frequency of drinking alcohol? Never   Alcohol more than 3/day or more than 7/wk Not Applicable   Do you use any other substances recreationally? 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 (!) YES one          5/23/2024   Contraception/Family Planning   Questions about contraception or family planning No        Reviewed and updated as needed this visit by Provider                    Past Medical History:   Diagnosis Date    Attention deficit hyperactivity disorder (ADHD), predominantly inattentive type 05/31/2017    Chronic daily headache      Past Surgical History:   Procedure Laterality Date    NO HISTORY OF SURGERY           Review of Systems  Constitutional, HEENT, cardiovascular, pulmonary, GI, , musculoskeletal, neuro, skin, endocrine and psych systems are negative, except as otherwise noted.     Objective    Exam  /79 (BP Location: Left arm, Patient Position: Sitting, Cuff Size: Adult Large)   Pulse 80   Temp 98.3  F (36.8  C) (Oral)   Resp 24   Ht 1.753 m (5' 9\")   Wt 104.4 kg (230 lb 3.2 oz)   SpO2 98%   BMI 33.99 kg/m     Estimated body mass index is 33.99 kg/m  as calculated from the following:    Height as of " "this encounter: 1.753 m (5' 9\").    Weight as of this encounter: 104.4 kg (230 lb 3.2 oz).    Physical Exam  GENERAL: alert and no distress  EYES: Eyes grossly normal to inspection, PERRL and conjunctivae and sclerae normal  HENT: ear canals and TM's normal, nose and mouth without ulcers or lesions  NECK: no adenopathy, no asymmetry, masses, or scars  RESP: lungs clear to auscultation - no rales, rhonchi or wheezes  CV: regular rate and rhythm, normal S1 S2, no S3 or S4, no murmur, click or rub, no peripheral edema  ABDOMEN: soft, nontender, no hepatosplenomegaly, no masses and bowel sounds normal  MS: no gross musculoskeletal defects noted, no edema  SKIN: no suspicious lesions or rashes  NEURO: Normal strength and tone, mentation intact and speech normal  PSYCH: mentation appears normal, affect normal/bright        Signed Electronically by: Brenden Piña MD    Answers submitted by the patient for this visit:  Patient Health Questionnaire (Submitted on 5/23/2024)  If you checked off any problems, how difficult have these problems made it for you to do your work, take care of things at home, or get along with other people?: Somewhat difficult  PHQ9 TOTAL SCORE: 1  WILFREDO-7 (Submitted on 5/23/2024)  WILFREDO 7 TOTAL SCORE: 1    "

## 2024-05-23 NOTE — PATIENT INSTRUCTIONS
"Preventive Care Advice   This is general advice we often give to help people stay healthy. Your care team may have specific advice just for you. Please talk to your care team about your own preventive care needs.  Lifestyle  Exercise at least 150 minutes each week (30 minutes a day, 5 days a week).  Do muscle strengthening activities 2 days a week. These help control your weight and prevent disease.  No smoking.  Wear sunscreen to prevent skin cancer.  Have your home tested for radon every 2 to 5 years. Radon is a colorless, odorless gas that can harm your lungs. To learn more, go to www.health.[facility]. and search for \"Radon in Homes.\"  Keep guns unloaded and locked up in a safe place like a safe or gun vault, or, use a gun lock and hide the keys. Always lock away bullets separately. To learn more, visit [employer].[place].gov and search for \"safe gun storage.\"  Nutrition  Eat 5 or more servings of fruits and vegetables each day.  Try wheat bread, brown rice and whole grain pasta (instead of white bread, rice, and pasta).  Get enough calcium and vitamin D. Check the label on foods and aim for 100% of the RDA (recommended daily allowance).  Regular exams  Have a dental exam and cleaning every 6 months.  See your health care team every year to talk about:  Any changes in your health.  Any medicines your care team has prescribed.  Preventive care, family planning, and ways to prevent chronic diseases.  Shots (vaccines)   HPV shots (up to age 26), if you've never had them before.  Hepatitis B shots (up to age 59), if you've never had them before.  COVID-19 shot: Get this shot when it's due.  Flu shot: Get a flu shot every year.  Tetanus shot: Get a tetanus shot every 10 years.  Pneumococcal, hepatitis A, and RSV shots: Ask your care team if you need these based on your risk.  Shingles shot (for age 50 and up).  General health tests  Diabetes screening:  Starting at age 35, Get screened for diabetes at least every 3 years.  If " you are younger than age 35, ask your care team if you should be screened for diabetes.  Cholesterol test: At age 39, start having a cholesterol test every 5 years, or more often if advised.  Bone density scan (DEXA): At age 50, ask your care team if you should have this scan for osteoporosis (brittle bones).  Hepatitis C: Get tested at least once in your life.  Abdominal aortic aneurysm screening: Talk to your doctor about having this screening if you:  Have ever smoked; and  Are biologically male; and  Are between the ages of 65 and 75.  STIs (sexually transmitted infections)  Before age 24: Ask your care team if you should be screened for STIs.  After age 24: Get screened for STIs if you're at risk. You are at risk for STIs (including HIV) if:  You are sexually active with more than one person.  You don't use condoms every time.  You or a partner was diagnosed with a sexually transmitted infection.  If you are at risk for HIV, ask about PrEP medicine to prevent HIV.  Get tested for HIV at least once in your life, whether you are at risk for HIV or not.  Cancer screening tests  Cervical cancer screening: If you have a cervix, begin getting regular cervical cancer screening tests at age 21. Most people who have regular screenings with normal results can stop after age 65. Talk about this with your provider.  Breast cancer scan (mammogram): If you've ever had breasts, begin having regular mammograms starting at age 40. This is a scan to check for breast cancer.  Colon cancer screening: It is important to start screening for colon cancer at age 45.  Have a colonoscopy test every 10 years (or more often if you're at risk) Or, ask your provider about stool tests like a FIT test every year or Cologuard test every 3 years.  To learn more about your testing options, visit: www."[employer]/320911.pdf.  For help making a decision, visit: jett/dz52522.  Prostate cancer screening test: If you have a prostate and are age 55  to 69, ask your provider if you would benefit from a yearly prostate cancer screening test.  Lung cancer screening: If you are a current or former smoker age 50 to 80, ask your care team if ongoing lung cancer screenings are right for you.  For informational purposes only. Not to replace the advice of your health care provider. Copyright   2023 [employer]. All rights reserved. Clinically reviewed by the [facility] Transitions Program. [employer] 710260 - REV 04/24.    Learning About Stress  What is stress?     Stress is your body's response to a hard situation. Your body can have a physical, emotional, or mental response. Stress is a fact of life for most people, and it affects everyone differently. What causes stress for you may not be stressful for someone else.  A lot of things can cause stress. You may feel stress when you go on a job interview, take a test, or run a race. This kind of short-term stress is normal and even useful. It can help you if you need to work hard or react quickly. For example, stress can help you finish an important job on time.  Long-term stress is caused by ongoing stressful situations or events. Examples of long-term stress include long-term health problems, ongoing problems at work, or conflicts in your family. Long-term stress can harm your health.  How does stress affect your health?  When you are stressed, your body responds as though you are in danger. It makes hormones that speed up your heart, make you breathe faster, and give you a burst of energy. This is called the fight-or-flight stress response. If the stress is over quickly, your body goes back to normal and no harm is done.  But if stress happens too often or lasts too long, it can have bad effects. Long-term stress can make you more likely to get sick, and it can make symptoms of some diseases worse. If you tense up when you are stressed, you may develop neck, shoulder, or low back pain. Stress is  linked to high blood pressure and heart disease.  Stress also harms your emotional health. It can make you burton, tense, or depressed. Your relationships may suffer, and you may not do well at work or school.  What can you do to manage stress?  You can try these things to help manage stress:   Do something active. Exercise or activity can help reduce stress. Walking is a great way to get started. Even everyday activities such as housecleaning or yard work can help.  Try yoga or tate chi. These techniques combine exercise and meditation. You may need some training at first to learn them.  Do something you enjoy. For example, listen to music or go to a movie. Practice your hobby or do volunteer work.  Meditate. This can help you relax, because you are not worrying about what happened before or what may happen in the future.  Do guided imagery. Imagine yourself in any setting that helps you feel calm. You can use online videos, books, or a teacher to guide you.  Do breathing exercises. For example:  From a standing position, bend forward from the waist with your knees slightly bent. Let your arms dangle close to the floor.  Breathe in slowly and deeply as you return to a standing position. Roll up slowly and lift your head last.  Hold your breath for just a few seconds in the standing position.  Breathe out slowly and bend forward from the waist.  Let your feelings out. Talk, laugh, cry, and express anger when you need to. Talking with supportive friends or family, a counselor, or a jackie leader about your feelings is a healthy way to relieve stress. Avoid discussing your feelings with people who make you feel worse.  Write. It may help to write about things that are bothering you. This helps you find out how much stress you feel and what is causing it. When you know this, you can find better ways to cope.  What can you do to prevent stress?  You might try some of these things to help prevent stress:  Manage your time.  "This helps you find time to do the things you want and need to do.  Get enough sleep. Your body recovers from the stresses of the day while you are sleeping.  Get support. Your family, friends, and community can make a difference in how you experience stress.  Limit your news feed. Avoid or limit time on social media or news that may make you feel stressed.  Do something active. Exercise or activity can help reduce stress. Walking is a great way to get started.  Where can you learn more?  Go to https://www.[employer].net/patiented  Enter N032 in the search box to learn more about \"Learning About Stress.\"  Current as of: October 24, 2023               Content Version: 14.0    8360-3159 [employer].   Care instructions adapted under license by your healthcare professional. If you have questions about a medical condition or this instruction, always ask your healthcare professional. [employer] disclaims any warranty or liability for your use of this information.      Safer Sex: Care Instructions  Overview  Safer sex is a way to reduce your risk of getting a sexually transmitted infection (STI). It can also help prevent pregnancy.  Several products can help you practice safer sex and reduce your chance of STIs. One of the best is a condom. There are internal and external condoms. You can use a special rubber sheet (dental dam) for protection during oral sex. Disposable gloves can keep your hands from touching blood, semen, or other body fluids that can carry infections.  Remember that birth control methods such as diaphragms, IUDs, foams, and birth control pills do not stop you from getting STIs.  Follow-up care is a key part of your treatment and safety. Be sure to make and go to all appointments, and call your doctor if you are having problems. It's also a good idea to know your test results and keep a list of the medicines you take.  How can you care for yourself at home?  Think about " "getting vaccinated to help prevent hepatitis A, hepatitis B, and human papillomavirus (HPV). They can be spread through sex.  Use a condom every time you have sex. Use an external condom, which goes on the penis. Or use an internal condom, which goes into the vagina or anus.  Make sure you use the right size external condom. A condom that's too small can break easily. A condom that's too big can slip off during sex.  Use a new condom each time you have sex. Be careful not to poke a hole in the condom when you open the wrapper.  Don't use an internal condom and an external condom at the same time.  Never use petroleum jelly (such as Vaseline), grease, hand lotion, baby oil, or anything with oil in it. These products can make holes in the condom.  After intercourse, hold the edge of the condom as you remove it. This will help keep semen from spilling out of the condom.  Do not have sex with anyone who has symptoms of an STI, such as sores on the genitals or mouth.  Do not drink a lot of alcohol or use drugs before sex.  Limit your sex partners. Sex with one partner who has sex only with you can reduce your risk of getting an STI.  Don't share sex toys. But if you do share them, use a condom and clean the sex toys between each use.  Talk to any partners before you have sex. Talk about what you feel comfortable with and whether you have any boundaries with sex. And find out if your partner or partners may be at risk for any STI. Keep in mind that a person may be able to spread an STI even if they do not have symptoms. You and any partners may want to get tested for STIs.  Where can you learn more?  Go to https://www.healthTrulia.net/patiented  Enter B608 in the search box to learn more about \"Safer Sex: Care Instructions.\"  Current as of: November 27, 2023               Content Version: 14.0    8599-4418 [employer].   Care instructions adapted under license by your healthcare professional. If you have " questions about a medical condition or this instruction, always ask your healthcare professional. [employer] disclaims any warranty or liability for your use of this information.

## 2024-05-24 LAB
C TRACH DNA SPEC QL NAA+PROBE: NEGATIVE
CREAT UR-MCNC: 87 MG/DL
FASTING STATUS PATIENT QL REPORTED: ABNORMAL
GLUCOSE SERPL-MCNC: 107 MG/DL (ref 70–99)
HIV 1+2 AB+HIV1 P24 AG SERPL QL IA: NONREACTIVE
N GONORRHOEA DNA SPEC QL NAA+PROBE: NEGATIVE

## 2024-05-30 LAB
AMPHET UR CFM-MCNC: 4100 NG/ML
AMPHET/CREAT UR: 4713 NG/MG {CREAT}

## 2024-06-08 ENCOUNTER — MYC MEDICAL ADVICE (OUTPATIENT)
Dept: FAMILY MEDICINE | Facility: CLINIC | Age: 29
End: 2024-06-08

## 2024-06-08 DIAGNOSIS — F90.0 ATTENTION DEFICIT HYPERACTIVITY DISORDER (ADHD), PREDOMINANTLY INATTENTIVE TYPE: ICD-10-CM

## 2024-06-10 RX ORDER — DEXTROAMPHETAMINE SACCHARATE, AMPHETAMINE ASPARTATE MONOHYDRATE, DEXTROAMPHETAMINE SULFATE AND AMPHETAMINE SULFATE 3.75; 3.75; 3.75; 3.75 MG/1; MG/1; MG/1; MG/1
15 CAPSULE, EXTENDED RELEASE ORAL DAILY
Qty: 30 CAPSULE | Refills: 0 | Status: SHIPPED | OUTPATIENT
Start: 2024-06-28 | End: 2024-07-28

## 2024-06-10 RX ORDER — DEXTROAMPHETAMINE SACCHARATE, AMPHETAMINE ASPARTATE MONOHYDRATE, DEXTROAMPHETAMINE SULFATE AND AMPHETAMINE SULFATE 3.75; 3.75; 3.75; 3.75 MG/1; MG/1; MG/1; MG/1
15 CAPSULE, EXTENDED RELEASE ORAL DAILY
Qty: 30 CAPSULE | Refills: 0 | Status: CANCELLED | OUTPATIENT
Start: 2024-06-27

## 2024-06-10 RX ORDER — DEXTROAMPHETAMINE SACCHARATE, AMPHETAMINE ASPARTATE MONOHYDRATE, DEXTROAMPHETAMINE SULFATE AND AMPHETAMINE SULFATE 3.75; 3.75; 3.75; 3.75 MG/1; MG/1; MG/1; MG/1
15 CAPSULE, EXTENDED RELEASE ORAL DAILY
Qty: 30 CAPSULE | Refills: 0 | Status: SHIPPED | OUTPATIENT
Start: 2024-08-28 | End: 2024-09-20

## 2024-06-10 RX ORDER — DEXTROAMPHETAMINE SACCHARATE, AMPHETAMINE ASPARTATE MONOHYDRATE, DEXTROAMPHETAMINE SULFATE AND AMPHETAMINE SULFATE 3.75; 3.75; 3.75; 3.75 MG/1; MG/1; MG/1; MG/1
15 CAPSULE, EXTENDED RELEASE ORAL DAILY
Qty: 30 CAPSULE | Refills: 0 | Status: SHIPPED | OUTPATIENT
Start: 2024-07-28 | End: 2024-08-27

## 2024-06-10 NOTE — TELEPHONE ENCOUNTER
Dr. Piña   Please see my chart   RN reviewed recent office visit notes after replying to patient   Appears he is asking for 3-4 months of Adderall to be printed out and picked up     Thank you   Naomi Lanza, Registered Nurse  [facility]

## 2024-06-10 NOTE — TELEPHONE ENCOUNTER
I prescribed 3, he already picked 2 of them, so please call the pharmacy and cancel the last one.  Then I will print 3 new prescriptions for him to  (paper copies)

## 2024-08-28 NOTE — TELEPHONE ENCOUNTER
Received call from pharmacy. Pharmacy calling to ask if script is verified. Patient trying to fill script for amphetamine-dextroamphetamine (ADDERALL XR) 15 MG 24 hr capsule in [place]. RN advised chart does show current medication script from Dr. Piña. No further questions from pharmacy.   amphetamine-dextroamphetamine (ADDERALL XR) 15 MG 24 hr capsule 30 capsule 0 8/28/2024 9/27/2024 No   Sig - Route: Take 1 capsule (15 mg) by mouth daily for 30 days - Oral   Class: Local Print   Earliest Fill Date: 8/24/2024   Order: [id number]      Rc GASTON RN 8/28/2024 at 11:29 AM

## 2024-09-20 ENCOUNTER — MYC MEDICAL ADVICE (OUTPATIENT)
Dept: FAMILY MEDICINE | Facility: CLINIC | Age: 29
End: 2024-09-20
Payer: COMMERCIAL

## 2024-09-20 DIAGNOSIS — F90.0 ATTENTION DEFICIT HYPERACTIVITY DISORDER (ADHD), PREDOMINANTLY INATTENTIVE TYPE: ICD-10-CM

## 2024-09-20 RX ORDER — DEXTROAMPHETAMINE SACCHARATE, AMPHETAMINE ASPARTATE MONOHYDRATE, DEXTROAMPHETAMINE SULFATE AND AMPHETAMINE SULFATE 3.75; 3.75; 3.75; 3.75 MG/1; MG/1; MG/1; MG/1
15 CAPSULE, EXTENDED RELEASE ORAL DAILY
Qty: 30 CAPSULE | Refills: 0 | Status: SHIPPED | OUTPATIENT
Start: 2024-09-28

## 2024-09-20 NOTE — TELEPHONE ENCOUNTER
See my chart     Asked patient to provide pharmacy for requested prescription and that we can try to send one more rx until established in that state   It is unclear if they will accept outside provider from different state rx to be faxed or escribed due to being a controlled substance     Awaiting patient response     Naomi Lanza, Registered Nurse  [facility]

## 2024-09-20 NOTE — TELEPHONE ENCOUNTER
Patient notified via my chart of message below     Naomi Lanza, Registered Nurse  [facility]

## 2024-09-20 NOTE — TELEPHONE ENCOUNTER
Dr. Piña   RN pended rx, either send via escribe OR print to fax     Uncertain if they will fill but will have to send and have them review and advise     Thank you   Naomi Lanza, Registered Nurse  [facility]

## 2024-09-20 NOTE — TELEPHONE ENCOUNTER
Dr. Piña   Please review my chart message   Patient currently in [place] he has moved (RN removed you from pcp)   Did you discuss giving further Adderall prescriptions? RN uncertain if you can or will provide further while in another state     Thank you   Naomi Lanza Registered Nurse  [facility]

## 2024-09-20 NOTE — TELEPHONE ENCOUNTER
Sent, will see if they will refill it.   I have no problem refilling this prescription but that will be the last one.   Otherwise, can inform him to start on 9/27 or 9/28 and no sooner.  And best of luck on his new endeavor in [place].

## 2024-09-20 NOTE — TELEPHONE ENCOUNTER
I wonder if we can fax one more prescription to him to [place], but I'm not sure if that's allowed.  His last adderall was from 8/28, so he should be due for 9/28.

## 2025-07-05 ENCOUNTER — HEALTH MAINTENANCE LETTER (OUTPATIENT)
Age: 30
End: 2025-07-05